# Patient Record
Sex: MALE | Race: WHITE | Employment: OTHER | URBAN - NONMETROPOLITAN AREA
[De-identification: names, ages, dates, MRNs, and addresses within clinical notes are randomized per-mention and may not be internally consistent; named-entity substitution may affect disease eponyms.]

---
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
## 2020-09-28 ENCOUNTER — OFFICE VISIT (OUTPATIENT)
Dept: FAMILY MEDICINE CLINIC | Age: 20
End: 2020-09-28
Payer: COMMERCIAL

## 2020-09-28 VITALS
HEART RATE: 88 BPM | TEMPERATURE: 97.6 F | RESPIRATION RATE: 20 BRPM | SYSTOLIC BLOOD PRESSURE: 102 MMHG | BODY MASS INDEX: 25.01 KG/M2 | WEIGHT: 127.4 LBS | HEIGHT: 60 IN | DIASTOLIC BLOOD PRESSURE: 54 MMHG

## 2020-09-28 PROCEDURE — 99385 PREV VISIT NEW AGE 18-39: CPT | Performed by: NURSE PRACTITIONER

## 2020-09-28 RX ORDER — FLUTICASONE PROPIONATE 110 UG/1
2 AEROSOL, METERED RESPIRATORY (INHALATION) 2 TIMES DAILY
COMMUNITY
End: 2022-05-19 | Stop reason: SDUPTHER

## 2020-09-28 RX ORDER — LISINOPRIL 30 MG/1
15 TABLET ORAL DAILY PRN
COMMUNITY
End: 2021-12-23

## 2020-09-28 RX ORDER — TRIAMTERENE AND HYDROCHLOROTHIAZIDE 75; 50 MG/1; MG/1
1 TABLET ORAL DAILY
Status: ON HOLD | COMMUNITY
End: 2021-09-11 | Stop reason: SDUPTHER

## 2020-09-28 SDOH — ECONOMIC STABILITY: INCOME INSECURITY: HOW HARD IS IT FOR YOU TO PAY FOR THE VERY BASICS LIKE FOOD, HOUSING, MEDICAL CARE, AND HEATING?: NOT HARD AT ALL

## 2020-09-28 SDOH — ECONOMIC STABILITY: FOOD INSECURITY: WITHIN THE PAST 12 MONTHS, THE FOOD YOU BOUGHT JUST DIDN'T LAST AND YOU DIDN'T HAVE MONEY TO GET MORE.: NEVER TRUE

## 2020-09-28 SDOH — ECONOMIC STABILITY: FOOD INSECURITY: WITHIN THE PAST 12 MONTHS, YOU WORRIED THAT YOUR FOOD WOULD RUN OUT BEFORE YOU GOT MONEY TO BUY MORE.: NEVER TRUE

## 2020-09-28 ASSESSMENT — PATIENT HEALTH QUESTIONNAIRE - PHQ9
SUM OF ALL RESPONSES TO PHQ QUESTIONS 1-9: 0
SUM OF ALL RESPONSES TO PHQ9 QUESTIONS 1 & 2: 0
SUM OF ALL RESPONSES TO PHQ QUESTIONS 1-9: 0
2. FEELING DOWN, DEPRESSED OR HOPELESS: 0
1. LITTLE INTEREST OR PLEASURE IN DOING THINGS: 0

## 2020-09-28 ASSESSMENT — ENCOUNTER SYMPTOMS
GASTROINTESTINAL NEGATIVE: 1
EYES NEGATIVE: 1
RESPIRATORY NEGATIVE: 1
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
## 2020-12-14 RX ORDER — LACTULOSE 10 G/15ML
20 SOLUTION ORAL DAILY
Qty: 2700 ML | Refills: 3 | Status: SHIPPED | OUTPATIENT
Start: 2020-12-14 | End: 2021-03-14

## 2021-03-22 ENCOUNTER — HOSPITAL ENCOUNTER (OUTPATIENT)
Dept: GENERAL RADIOLOGY | Age: 21
Discharge: HOME | End: 2021-03-22
Payer: COMMERCIAL

## 2021-03-22 ENCOUNTER — TELEPHONE (OUTPATIENT)
Dept: FAMILY MEDICINE CLINIC | Age: 21
End: 2021-03-22

## 2021-03-22 ENCOUNTER — OFFICE VISIT (OUTPATIENT)
Dept: FAMILY MEDICINE CLINIC | Age: 21
End: 2021-03-22
Payer: COMMERCIAL

## 2021-03-22 ENCOUNTER — HOSPITAL ENCOUNTER (OUTPATIENT)
Age: 21
Discharge: HOME | End: 2021-03-22
Payer: COMMERCIAL

## 2021-03-22 VITALS
TEMPERATURE: 97.1 F | BODY MASS INDEX: 24.46 KG/M2 | HEIGHT: 60 IN | OXYGEN SATURATION: 97 % | SYSTOLIC BLOOD PRESSURE: 108 MMHG | WEIGHT: 124.6 LBS | DIASTOLIC BLOOD PRESSURE: 56 MMHG | HEART RATE: 125 BPM | RESPIRATION RATE: 22 BRPM

## 2021-03-22 DIAGNOSIS — M79.671 RIGHT FOOT PAIN: ICD-10-CM

## 2021-03-22 DIAGNOSIS — S92.901A CLOSED FRACTURE OF RIGHT FOOT, INITIAL ENCOUNTER: Primary | ICD-10-CM

## 2021-03-22 DIAGNOSIS — M79.671 RIGHT FOOT PAIN: Primary | ICD-10-CM

## 2021-03-22 PROCEDURE — 73630 X-RAY EXAM OF FOOT: CPT

## 2021-03-22 PROCEDURE — 99214 OFFICE O/P EST MOD 30 MIN: CPT | Performed by: NURSE PRACTITIONER

## 2021-03-22 ASSESSMENT — ENCOUNTER SYMPTOMS
GASTROINTESTINAL NEGATIVE: 1
EYES NEGATIVE: 1
RESPIRATORY NEGATIVE: 1
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
## 2021-03-23 NOTE — PROGRESS NOTES
Mother left message on vm stating pt sees Dr Darrell Lucas already, wanting to know if it is ok to follow with her instead of [place]    Call mom [phone]

## 2021-03-26 NOTE — TELEPHONE ENCOUNTER
Patients mother voiced they went to Dr. Paul Bennett who gave patient a walking boot. Patient is doing well with it and a 4 post cane. No concerns voiced.

## 2021-03-26 NOTE — TELEPHONE ENCOUNTER
Dr. Josiah Tsai office LM stating that he does not have any openings to see for a fracture within the next week. She did voice that the patient can see any provider in that office. Mother can call their office at [phone] option 1, twice to schedule. Referral has been faxed to [phone] for if mother wants to schedule. Left message for patiens mother  to call office back.

## 2021-04-02 ENCOUNTER — HOSPITAL ENCOUNTER (OUTPATIENT)
Age: 21
Discharge: HOME | End: 2021-04-02
Payer: COMMERCIAL

## 2021-04-02 LAB
AMORPHOUS: NORMAL
BACTERIA: NORMAL
BILIRUBIN URINE: NEGATIVE
BLOOD, URINE: NEGATIVE
CASTS UA: NORMAL /LPF
CHARACTER, URINE: CLEAR
COLOR: ABNORMAL
CRYSTALS, UA: NORMAL
EPITHELIAL CELLS, UA: NORMAL /HPF
GLUCOSE, URINE: 100 MG/DL
KETONES, URINE: NEGATIVE
LEUKOCYTE ESTERASE, URINE: NEGATIVE
MUCUS: NORMAL
NITRITE, URINE: NEGATIVE
PH UA: 5.5 (ref 5–9)
PROTEIN UA: NEGATIVE MG/DL
RBC URINE: NORMAL /HPF
SPECIFIC GRAVITY UA: 1.02 (ref 1–1.03)
UROBILINOGEN, URINE: 0.2 EU/DL (ref 0.2–1)
WBC UA: NORMAL /HPF

## 2021-04-02 PROCEDURE — 81003 URINALYSIS AUTO W/O SCOPE: CPT

## 2021-04-02 PROCEDURE — 81001 URINALYSIS AUTO W/SCOPE: CPT

## 2021-04-05 ENCOUNTER — HOSPITAL ENCOUNTER (OUTPATIENT)
Age: 21
Discharge: HOME | End: 2021-04-05
Payer: COMMERCIAL

## 2021-04-05 DIAGNOSIS — R81 GLUCOSURIA: ICD-10-CM

## 2021-04-05 DIAGNOSIS — R81 GLUCOSURIA: Primary | ICD-10-CM

## 2021-04-05 PROCEDURE — 36415 COLL VENOUS BLD VENIPUNCTURE: CPT

## 2021-04-05 PROCEDURE — 83036 HEMOGLOBIN GLYCOSYLATED A1C: CPT

## 2021-04-06 LAB
AVERAGE GLUCOSE: 99 MG/DL (ref 70–126)
HBA1C MFR BLD: 5.3 % (ref 4.4–6.4)

## 2021-04-17 ENCOUNTER — HOSPITAL ENCOUNTER (OUTPATIENT)
Age: 21
Discharge: HOME | End: 2021-04-17
Payer: COMMERCIAL

## 2021-04-17 LAB
ALBUMIN SERPL-MCNC: 4.6 G/DL (ref 3.5–5.1)
ALP BLD-CCNC: 90 U/L (ref 38–126)
ALT SERPL-CCNC: 28 U/L (ref 11–66)
ANION GAP SERPL CALCULATED.3IONS-SCNC: 14 MEQ/L (ref 8–16)
AST SERPL-CCNC: 25 U/L (ref 5–40)
BILIRUB SERPL-MCNC: 0.4 MG/DL (ref 0.3–1.2)
BUN BLDV-MCNC: 26 MG/DL (ref 7–22)
CALCIUM SERPL-MCNC: 9.8 MG/DL (ref 8.5–10.5)
CHLORIDE BLD-SCNC: 96 MEQ/L (ref 98–111)
CO2: 25 MEQ/L (ref 23–33)
CREAT SERPL-MCNC: 0.6 MG/DL (ref 0.4–1.2)
GFR SERPL CREATININE-BSD FRML MDRD: > 90 ML/MIN/1.73M2
GLUCOSE BLD-MCNC: 87 MG/DL (ref 70–108)
PHOSPHORUS: 3.2 MG/DL (ref 2.4–4.7)
POTASSIUM SERPL-SCNC: 4.2 MEQ/L (ref 3.5–5.2)
SODIUM BLD-SCNC: 135 MEQ/L (ref 135–145)
TOTAL PROTEIN: 7.6 G/DL (ref 6.1–8)
URIC ACID: 4.5 MG/DL (ref 3.7–7)

## 2021-04-17 PROCEDURE — 84550 ASSAY OF BLOOD/URIC ACID: CPT

## 2021-04-17 PROCEDURE — 84100 ASSAY OF PHOSPHORUS: CPT

## 2021-04-17 PROCEDURE — 80053 COMPREHEN METABOLIC PANEL: CPT

## 2021-04-17 PROCEDURE — 36415 COLL VENOUS BLD VENIPUNCTURE: CPT

## 2021-04-22 ENCOUNTER — HOSPITAL ENCOUNTER (OUTPATIENT)
Dept: ULTRASOUND IMAGING | Age: 21
Discharge: HOME | End: 2021-04-22
Payer: COMMERCIAL

## 2021-04-22 DIAGNOSIS — N18.1 STAGE 1 CHRONIC KIDNEY DISEASE: ICD-10-CM

## 2021-04-22 PROCEDURE — 76770 US EXAM ABDO BACK WALL COMP: CPT

## 2021-08-24 ENCOUNTER — OFFICE VISIT (OUTPATIENT)
Dept: FAMILY MEDICINE CLINIC | Age: 21
End: 2021-08-24
Payer: COMMERCIAL

## 2021-08-24 ENCOUNTER — NURSE ONLY (OUTPATIENT)
Dept: LAB | Age: 21
End: 2021-08-24

## 2021-08-24 VITALS
RESPIRATION RATE: 18 BRPM | HEART RATE: 78 BPM | DIASTOLIC BLOOD PRESSURE: 70 MMHG | SYSTOLIC BLOOD PRESSURE: 120 MMHG | BODY MASS INDEX: 26.37 KG/M2 | WEIGHT: 135 LBS | TEMPERATURE: 97.8 F

## 2021-08-24 DIAGNOSIS — Z00.00 ROUTINE PHYSICAL EXAMINATION: Primary | ICD-10-CM

## 2021-08-24 DIAGNOSIS — J38.6 SUBGLOTTIC STENOSIS: ICD-10-CM

## 2021-08-24 DIAGNOSIS — G80.9 CEREBRAL PALSY, UNSPECIFIED TYPE (HCC): ICD-10-CM

## 2021-08-24 DIAGNOSIS — J44.9 CHRONIC OBSTRUCTIVE PULMONARY DISEASE, UNSPECIFIED COPD TYPE (HCC): ICD-10-CM

## 2021-08-24 DIAGNOSIS — I10 ESSENTIAL HYPERTENSION: ICD-10-CM

## 2021-08-24 DIAGNOSIS — R63.5 WEIGHT GAIN: ICD-10-CM

## 2021-08-24 LAB
ALBUMIN SERPL-MCNC: 5 G/DL (ref 3.5–5.1)
ALP BLD-CCNC: 86 U/L (ref 38–126)
ALT SERPL-CCNC: 22 U/L (ref 11–66)
ANION GAP SERPL CALCULATED.3IONS-SCNC: 17 MEQ/L (ref 8–16)
AST SERPL-CCNC: 25 U/L (ref 5–40)
BASOPHILS # BLD: 0.4 %
BASOPHILS ABSOLUTE: 0 THOU/MM3 (ref 0–0.1)
BILIRUB SERPL-MCNC: 0.2 MG/DL (ref 0.3–1.2)
BUN BLDV-MCNC: 58 MG/DL (ref 7–22)
CALCIUM SERPL-MCNC: 10.1 MG/DL (ref 8.5–10.5)
CHLORIDE BLD-SCNC: 100 MEQ/L (ref 98–111)
CO2: 20 MEQ/L (ref 23–33)
CREAT SERPL-MCNC: 2 MG/DL (ref 0.4–1.2)
EOSINOPHIL # BLD: 0.6 %
EOSINOPHILS ABSOLUTE: 0.1 THOU/MM3 (ref 0–0.4)
ERYTHROCYTE [DISTWIDTH] IN BLOOD BY AUTOMATED COUNT: 12.4 % (ref 11.5–14.5)
ERYTHROCYTE [DISTWIDTH] IN BLOOD BY AUTOMATED COUNT: 43.8 FL (ref 35–45)
GFR SERPL CREATININE-BSD FRML MDRD: 42 ML/MIN/1.73M2
GLUCOSE BLD-MCNC: 86 MG/DL (ref 70–108)
HCT VFR BLD CALC: 38.7 % (ref 42–52)
HEMOGLOBIN: 12.5 GM/DL (ref 14–18)
IMMATURE GRANS (ABS): 0.05 THOU/MM3 (ref 0–0.07)
IMMATURE GRANULOCYTES: 0.6 %
LYMPHOCYTES # BLD: 13.6 %
LYMPHOCYTES ABSOLUTE: 1.2 THOU/MM3 (ref 1–4.8)
MCH RBC QN AUTO: 31.2 PG (ref 26–33)
MCHC RBC AUTO-ENTMCNC: 32.3 GM/DL (ref 32.2–35.5)
MCV RBC AUTO: 96.5 FL (ref 80–94)
MONOCYTES # BLD: 8.7 %
MONOCYTES ABSOLUTE: 0.7 THOU/MM3 (ref 0.4–1.3)
NUCLEATED RED BLOOD CELLS: 0 /100 WBC
PLATELET # BLD: 330 THOU/MM3 (ref 130–400)
PMV BLD AUTO: 10.5 FL (ref 9.4–12.4)
POTASSIUM SERPL-SCNC: 4.9 MEQ/L (ref 3.5–5.2)
RBC # BLD: 4.01 MILL/MM3 (ref 4.7–6.1)
SARS-COV-2 ANTIBODY, TOTAL: NEGATIVE
SEG NEUTROPHILS: 76.1 %
SEGMENTED NEUTROPHILS ABSOLUTE COUNT: 6.5 THOU/MM3 (ref 1.8–7.7)
SODIUM BLD-SCNC: 137 MEQ/L (ref 135–145)
TOTAL PROTEIN: 7.9 G/DL (ref 6.1–8)
TSH SERPL DL<=0.05 MIU/L-ACNC: 2.5 UIU/ML (ref 0.4–4.2)
WBC # BLD: 8.5 THOU/MM3 (ref 4.8–10.8)

## 2021-08-24 PROCEDURE — 99395 PREV VISIT EST AGE 18-39: CPT | Performed by: NURSE PRACTITIONER

## 2021-08-24 ASSESSMENT — PATIENT HEALTH QUESTIONNAIRE - PHQ9: DEPRESSION UNABLE TO ASSESS: FUNCTIONAL CAPACITY MOTIVATION LIMITS ACCURACY

## 2021-08-24 ASSESSMENT — ENCOUNTER SYMPTOMS
GASTROINTESTINAL NEGATIVE: 1
RESPIRATORY NEGATIVE: 1
EYES NEGATIVE: 1
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
## 2021-09-09 ENCOUNTER — NURSE ONLY (OUTPATIENT)
Dept: LAB | Age: 21
End: 2021-09-09

## 2021-09-09 ENCOUNTER — HOSPITAL ENCOUNTER (INPATIENT)
Age: 21
LOS: 2 days | Discharge: HOME | DRG: 640 | End: 2021-09-11
Attending: EMERGENCY MEDICINE | Admitting: INTERNAL MEDICINE
Payer: COMMERCIAL

## 2021-09-09 ENCOUNTER — APPOINTMENT (OUTPATIENT)
Dept: ULTRASOUND IMAGING | Age: 21
DRG: 640 | End: 2021-09-09
Payer: COMMERCIAL

## 2021-09-09 ENCOUNTER — APPOINTMENT (OUTPATIENT)
Dept: GENERAL RADIOLOGY | Age: 21
DRG: 640 | End: 2021-09-09
Payer: COMMERCIAL

## 2021-09-09 DIAGNOSIS — R41.0 CONFUSION: ICD-10-CM

## 2021-09-09 DIAGNOSIS — N17.9 ACUTE KIDNEY INJURY (HCC): ICD-10-CM

## 2021-09-09 DIAGNOSIS — N19 UREMIA: ICD-10-CM

## 2021-09-09 DIAGNOSIS — R41.0 CONFUSION: Primary | ICD-10-CM

## 2021-09-09 DIAGNOSIS — I95.89 HYPOTENSION DUE TO HYPOVOLEMIA: Primary | ICD-10-CM

## 2021-09-09 DIAGNOSIS — E86.1 HYPOTENSION DUE TO HYPOVOLEMIA: Primary | ICD-10-CM

## 2021-09-09 PROBLEM — I95.9 HYPOTENSION: Status: ACTIVE | Noted: 2021-09-09

## 2021-09-09 LAB
ALBUMIN SERPL-MCNC: 4.5 GM/DL (ref 3.4–5)
ALP BLD-CCNC: 85 U/L (ref 46–116)
ALT SERPL-CCNC: 64 U/L (ref 14–63)
AMORPHOUS: ABNORMAL
ANION GAP SERPL CALCULATED.3IONS-SCNC: 12 MEQ/L (ref 8–16)
ANION GAP: 16 MEQ/L (ref 8–16)
AST SERPL-CCNC: 44 U/L (ref 15–37)
BACTERIA: ABNORMAL
BACTERIA: ABNORMAL /HPF
BASOPHILS # BLD: 0.3 % (ref 0–3)
BETA-HYDROXYBUTYRATE: 2.25 MG/DL (ref 0.2–2.81)
BILIRUB SERPL-MCNC: 0.3 MG/DL (ref 0.2–1)
BILIRUBIN URINE: NEGATIVE
BILIRUBIN URINE: NEGATIVE
BLOOD, URINE: ABNORMAL
BLOOD, URINE: ABNORMAL
BUN BLDV-MCNC: 108 MG/DL (ref 7–18)
BUN BLDV-MCNC: 76 MG/DL (ref 7–22)
CALCIUM SERPL-MCNC: 9.5 MG/DL (ref 8.5–10.5)
CASTS 2: ABNORMAL /LPF
CASTS 2: ABNORMAL /LPF
CASTS UA: ABNORMAL /LPF
CASTS UA: ABNORMAL /LPF
CHARACTER, URINE: ABNORMAL
CHARACTER, URINE: CLEAR
CHLORIDE BLD-SCNC: 105 MEQ/L (ref 98–111)
CHLORIDE BLD-SCNC: 98 MEQ/L (ref 98–107)
CO2: 23 MEQ/L (ref 23–33)
CO2: 24 MEQ/L (ref 21–32)
COLOR: YELLOW
COLOR: YELLOW
CREAT SERPL-MCNC: 1.9 MG/DL (ref 0.4–1.2)
CREAT SERPL-MCNC: 3.6 MG/DL (ref 0.6–1.3)
CRYSTALS, UA: ABNORMAL
CRYSTALS, UA: ABNORMAL
EOSINOPHILS RELATIVE PERCENT: 1.2 % (ref 0–4)
EPITHELIAL CELLS, UA: ABNORMAL /HPF
EPITHELIAL CELLS, UA: ABNORMAL /HPF
GFR SERPL CREATININE-BSD FRML MDRD: 45 ML/MIN/1.73M2
GFR, ESTIMATED: 23 ML/MIN/1.73M2
GLUCOSE BLD-MCNC: 102 MG/DL (ref 70–108)
GLUCOSE BLD-MCNC: 97 MG/DL (ref 74–106)
GLUCOSE URINE: 500 MG/DL
GLUCOSE, URINE: 250 MG/DL
HCT VFR BLD CALC: 34.4 % (ref 42–52)
HEMOGLOBIN: 11.4 GM/DL (ref 14–18)
KETONES, URINE: NEGATIVE
KETONES, URINE: NEGATIVE
LACTATE: 1.1 MMOL/L (ref 0.9–1.7)
LEUKOCYTE ESTERASE, URINE: NEGATIVE
LEUKOCYTE ESTERASE, URINE: NEGATIVE
LYMPHOCYTES # BLD: 19.9 % (ref 15–47)
MAGNESIUM: 3.1 MG/DL (ref 1.8–2.4)
MCH RBC QN AUTO: 30.4 PG (ref 27–31)
MCHC RBC AUTO-ENTMCNC: 33.1 GM/DL (ref 33–37)
MCV RBC AUTO: 91.8 FL (ref 80–94)
MISCELLANEOUS 2: ABNORMAL
MISCELLANEOUS LAB TEST RESULT: ABNORMAL
MONOCYTES: 8.1 % (ref 0–12)
MUCUS: ABNORMAL
NITRITE, URINE: NEGATIVE
NITRITE, URINE: NEGATIVE
OSMOLALITY URINE: 370 MOSMOL/KG (ref 250–750)
OSMOLALITY: 319 MOSMOL/KG (ref 275–295)
PDW BLD-RTO: 12.3 % (ref 11.5–14.5)
PH UA: 5.5 (ref 5–9)
PH UA: 5.5 (ref 5–9)
PLATELET # BLD: 384 THOU/MM3 (ref 130–400)
PMV BLD AUTO: 7.3 FL (ref 7.4–10.4)
POC CALCIUM: 9.2 MG/DL (ref 8.5–10.1)
POTASSIUM SERPL-SCNC: 3.8 MEQ/L (ref 3.5–5.1)
POTASSIUM SERPL-SCNC: 3.8 MEQ/L (ref 3.5–5.2)
PROTEIN UA: NEGATIVE
PROTEIN UA: NEGATIVE MG/DL
RBC # BLD: 3.75 MILL/MM3 (ref 4.7–6.1)
RBC UA: ABNORMAL /HPF
RBC URINE: ABNORMAL /HPF
REFLEX TO URINE C & S: ABNORMAL
RENAL EPITHELIAL, UA: ABNORMAL
SARS-COV-2, NAAT: NOT  DETECTED
SEGS: 70.5 % (ref 43–75)
SODIUM BLD-SCNC: 138 MEQ/L (ref 136–145)
SODIUM BLD-SCNC: 140 MEQ/L (ref 135–145)
SODIUM URINE: 104 MEQ/L
SPECIFIC GRAVITY UA: 1.01 (ref 1–1.03)
SPECIFIC GRAVITY, URINE: 1.01 (ref 1–1.03)
TOTAL PROTEIN: 8.3 GM/DL (ref 6.4–8.2)
UROBILINOGEN, URINE: 0.2 EU/DL (ref 0–1)
UROBILINOGEN, URINE: 0.2 EU/DL (ref 0–1)
WBC # BLD: 8.6 THOU/MM3 (ref 4.8–10.8)
WBC UA: ABNORMAL /HPF
WBC UA: ABNORMAL /HPF
YEAST: ABNORMAL

## 2021-09-09 PROCEDURE — 71045 X-RAY EXAM CHEST 1 VIEW: CPT

## 2021-09-09 PROCEDURE — 96360 HYDRATION IV INFUSION INIT: CPT

## 2021-09-09 PROCEDURE — 82010 KETONE BODYS QUAN: CPT

## 2021-09-09 PROCEDURE — 85025 COMPLETE CBC W/AUTO DIFF WBC: CPT

## 2021-09-09 PROCEDURE — 6370000000 HC RX 637 (ALT 250 FOR IP): Performed by: INTERNAL MEDICINE

## 2021-09-09 PROCEDURE — 80053 COMPREHEN METABOLIC PANEL: CPT

## 2021-09-09 PROCEDURE — 84300 ASSAY OF URINE SODIUM: CPT

## 2021-09-09 PROCEDURE — 99282 EMERGENCY DEPT VISIT SF MDM: CPT

## 2021-09-09 PROCEDURE — 36415 COLL VENOUS BLD VENIPUNCTURE: CPT

## 2021-09-09 PROCEDURE — 87635 SARS-COV-2 COVID-19 AMP PRB: CPT

## 2021-09-09 PROCEDURE — 76770 US EXAM ABDO BACK WALL COMP: CPT

## 2021-09-09 PROCEDURE — 2060000000 HC ICU INTERMEDIATE R&B

## 2021-09-09 PROCEDURE — 99222 1ST HOSP IP/OBS MODERATE 55: CPT | Performed by: INTERNAL MEDICINE

## 2021-09-09 PROCEDURE — 83735 ASSAY OF MAGNESIUM: CPT

## 2021-09-09 PROCEDURE — 99254 IP/OBS CNSLTJ NEW/EST MOD 60: CPT | Performed by: INTERNAL MEDICINE

## 2021-09-09 PROCEDURE — 81001 URINALYSIS AUTO W/SCOPE: CPT

## 2021-09-09 PROCEDURE — 2580000003 HC RX 258: Performed by: EMERGENCY MEDICINE

## 2021-09-09 PROCEDURE — 83605 ASSAY OF LACTIC ACID: CPT

## 2021-09-09 PROCEDURE — 6370000000 HC RX 637 (ALT 250 FOR IP): Performed by: STUDENT IN AN ORGANIZED HEALTH CARE EDUCATION/TRAINING PROGRAM

## 2021-09-09 PROCEDURE — 83930 ASSAY OF BLOOD OSMOLALITY: CPT

## 2021-09-09 PROCEDURE — 83935 ASSAY OF URINE OSMOLALITY: CPT

## 2021-09-09 RX ORDER — BACLOFEN 10 MG/1
20 TABLET ORAL 2 TIMES DAILY
Status: DISCONTINUED | OUTPATIENT
Start: 2021-09-09 | End: 2021-09-11 | Stop reason: HOSPADM

## 2021-09-09 RX ORDER — MULTIVITAMIN WITH IRON
1 TABLET ORAL DAILY
Status: DISCONTINUED | OUTPATIENT
Start: 2021-09-09 | End: 2021-09-11 | Stop reason: HOSPADM

## 2021-09-09 RX ORDER — ACETAMINOPHEN 650 MG/1
650 SUPPOSITORY RECTAL EVERY 6 HOURS PRN
Status: DISCONTINUED | OUTPATIENT
Start: 2021-09-09 | End: 2021-09-11 | Stop reason: HOSPADM

## 2021-09-09 RX ORDER — PROMETHAZINE HYDROCHLORIDE 25 MG/1
12.5 TABLET ORAL EVERY 6 HOURS PRN
Status: DISCONTINUED | OUTPATIENT
Start: 2021-09-09 | End: 2021-09-11 | Stop reason: HOSPADM

## 2021-09-09 RX ORDER — SODIUM CHLORIDE 0.9 % (FLUSH) 0.9 %
5-40 SYRINGE (ML) INJECTION EVERY 12 HOURS SCHEDULED
Status: DISCONTINUED | OUTPATIENT
Start: 2021-09-09 | End: 2021-09-11 | Stop reason: HOSPADM

## 2021-09-09 RX ORDER — SODIUM CHLORIDE 0.9 % (FLUSH) 0.9 %
10 SYRINGE (ML) INJECTION PRN
Status: DISCONTINUED | OUTPATIENT
Start: 2021-09-09 | End: 2021-09-11 | Stop reason: HOSPADM

## 2021-09-09 RX ORDER — 0.9 % SODIUM CHLORIDE 0.9 %
30 INTRAVENOUS SOLUTION INTRAVENOUS ONCE
Status: COMPLETED | OUTPATIENT
Start: 2021-09-09 | End: 2021-09-09

## 2021-09-09 RX ORDER — ONDANSETRON 2 MG/ML
4 INJECTION INTRAMUSCULAR; INTRAVENOUS EVERY 6 HOURS PRN
Status: DISCONTINUED | OUTPATIENT
Start: 2021-09-09 | End: 2021-09-11 | Stop reason: HOSPADM

## 2021-09-09 RX ORDER — FAMOTIDINE 20 MG/1
20 TABLET, FILM COATED ORAL DAILY
Status: DISCONTINUED | OUTPATIENT
Start: 2021-09-10 | End: 2021-09-11 | Stop reason: HOSPADM

## 2021-09-09 RX ORDER — LEVALBUTEROL TARTRATE 45 UG/1
2 AEROSOL, METERED ORAL EVERY 6 HOURS PRN
Status: DISCONTINUED | OUTPATIENT
Start: 2021-09-09 | End: 2021-09-11 | Stop reason: HOSPADM

## 2021-09-09 RX ORDER — SENNA AND DOCUSATE SODIUM 50; 8.6 MG/1; MG/1
1 TABLET, FILM COATED ORAL NIGHTLY
Status: DISCONTINUED | OUTPATIENT
Start: 2021-09-09 | End: 2021-09-11 | Stop reason: HOSPADM

## 2021-09-09 RX ORDER — SENNA PLUS 8.6 MG/1
1 TABLET ORAL DAILY PRN
Status: DISCONTINUED | OUTPATIENT
Start: 2021-09-09 | End: 2021-09-11 | Stop reason: HOSPADM

## 2021-09-09 RX ORDER — FLUTICASONE PROPIONATE 50 MCG
1 SPRAY, SUSPENSION (ML) NASAL 2 TIMES DAILY
Status: DISCONTINUED | OUTPATIENT
Start: 2021-09-09 | End: 2021-09-11 | Stop reason: HOSPADM

## 2021-09-09 RX ORDER — HEPARIN SODIUM 5000 [USP'U]/ML
5000 INJECTION, SOLUTION INTRAVENOUS; SUBCUTANEOUS EVERY 8 HOURS SCHEDULED
Status: DISCONTINUED | OUTPATIENT
Start: 2021-09-09 | End: 2021-09-11 | Stop reason: HOSPADM

## 2021-09-09 RX ORDER — SODIUM CHLORIDE 9 MG/ML
INJECTION, SOLUTION INTRAVENOUS CONTINUOUS
Status: ACTIVE | OUTPATIENT
Start: 2021-09-09 | End: 2021-09-10

## 2021-09-09 RX ORDER — LACTULOSE 10 G/15ML
30 SOLUTION ORAL DAILY
COMMUNITY
End: 2021-12-23

## 2021-09-09 RX ORDER — FLUTICASONE PROPIONATE 110 UG/1
2 AEROSOL, METERED RESPIRATORY (INHALATION) 2 TIMES DAILY
Status: DISCONTINUED | OUTPATIENT
Start: 2021-09-09 | End: 2021-09-11 | Stop reason: HOSPADM

## 2021-09-09 RX ORDER — SODIUM CHLORIDE 9 MG/ML
25 INJECTION, SOLUTION INTRAVENOUS PRN
Status: DISCONTINUED | OUTPATIENT
Start: 2021-09-09 | End: 2021-09-11 | Stop reason: HOSPADM

## 2021-09-09 RX ORDER — ACETAMINOPHEN 325 MG/1
650 TABLET ORAL EVERY 6 HOURS PRN
Status: DISCONTINUED | OUTPATIENT
Start: 2021-09-09 | End: 2021-09-11 | Stop reason: HOSPADM

## 2021-09-09 RX ADMIN — SENNOSIDES 8.6 MG: 8.6 TABLET, COATED ORAL at 21:28

## 2021-09-09 RX ADMIN — SODIUM CHLORIDE 1905 ML: 9 INJECTION, SOLUTION INTRAVENOUS at 11:30

## 2021-09-09 RX ADMIN — FLUTICASONE PROPIONATE 1 SPRAY: 50 SPRAY, METERED NASAL at 21:28

## 2021-09-09 RX ADMIN — Medication 1 TABLET: at 21:28

## 2021-09-09 RX ADMIN — SODIUM CHLORIDE: 9 INJECTION, SOLUTION INTRAVENOUS at 13:53

## 2021-09-09 ASSESSMENT — ENCOUNTER SYMPTOMS
VOMITING: 0
COUGH: 0
DIARRHEA: 0
CONSTIPATION: 1
SHORTNESS OF BREATH: 0

## 2021-09-09 NOTE — ED NOTES
1st bag of fluids infused, 2nd bag of saline hung at wide open rate, site soft and without edema or redness.      Corby Santos RN  09/09/21 [id number]

## 2021-09-09 NOTE — ED NOTES
Pt more alert, resp even and unlabored, skin pale, warm and dry. IV continues at 500ml/hour, site soft and without edema or redness. [facility] here and report given, pt released to be transported to [address] [place] [address] in stable condition.       Brittany Cardozo RN  09/09/21 6777

## 2021-09-09 NOTE — ED NOTES
[facility] called for transport to [facility]. 2nd bag of fluids infused, 3rd bag of 1000ml's . 9NS hung at 500ml/hour via Dr Sandra Shell.      Javi Ying RN  09/09/21 4800
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
## 2021-09-09 NOTE — ED NOTES
Dad complains of pt acting confused and not acting right for the last week. Dad denies fever, vomiting or diarrhea. Pt alert but staring straight ahead, resp even and unlabored, skin pale, warm and dry.       Ovidio Hanson RN  09/09/21 [id number]
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
## 2021-09-09 NOTE — PROGRESS NOTES
Transfer  Report from Brennen Angulo  Referring Physician  Dr. Elissa Redman  Accepting Physician Dr. Wojciech Phoenix  Patient Condition:     2000. Patient of Dr. Elissa Redman at [facility]. Presented with c/o per mom of more confused, not eating and drinking well at home over the last week. Patient has CP lives at home and is nonverbal. Base CR less than .8, today 3.6 and . HB 11.4, Mg 3.1, Lactic 1.10, Covid sent. Given fluid bolus. Lowest B/P systolic in the 95'I. Last . T 97, HR 68, R 20.  Admit to Stepdown, Inpatient, Hypotension

## 2021-09-09 NOTE — ED NOTES
Dad states pt is acting more alert at this time, pt given lunch and juice to drink.      Javi Ynig RN  09/09/21 1884

## 2021-09-10 LAB
ALBUMIN SERPL-MCNC: 4.6 G/DL (ref 3.5–5.1)
ALP BLD-CCNC: 77 U/L (ref 38–126)
ALT SERPL-CCNC: 46 U/L (ref 11–66)
AMMONIA: 46 UMOL/L (ref 11–60)
ANION GAP SERPL CALCULATED.3IONS-SCNC: 11 MEQ/L (ref 8–16)
ANION GAP SERPL CALCULATED.3IONS-SCNC: 18 MEQ/L (ref 8–16)
AST SERPL-CCNC: 45 U/L (ref 5–40)
BASOPHILS # BLD: 0.5 %
BASOPHILS ABSOLUTE: 0 THOU/MM3 (ref 0–0.1)
BILIRUB SERPL-MCNC: < 0.2 MG/DL (ref 0.3–1.2)
BUN BLDV-MCNC: 102 MG/DL (ref 7–22)
BUN BLDV-MCNC: 55 MG/DL (ref 7–22)
CALCIUM SERPL-MCNC: 9.7 MG/DL (ref 8.5–10.5)
CALCIUM SERPL-MCNC: 9.9 MG/DL (ref 8.5–10.5)
CHLORIDE BLD-SCNC: 106 MEQ/L (ref 98–111)
CHLORIDE BLD-SCNC: 96 MEQ/L (ref 98–111)
CO2: 20 MEQ/L (ref 23–33)
CO2: 25 MEQ/L (ref 23–33)
CREAT SERPL-MCNC: 1.2 MG/DL (ref 0.4–1.2)
CREAT SERPL-MCNC: 3.3 MG/DL (ref 0.4–1.2)
EOSINOPHIL # BLD: 0.6 %
EOSINOPHILS ABSOLUTE: 0.1 THOU/MM3 (ref 0–0.4)
ERYTHROCYTE [DISTWIDTH] IN BLOOD BY AUTOMATED COUNT: 12.3 % (ref 11.5–14.5)
ERYTHROCYTE [DISTWIDTH] IN BLOOD BY AUTOMATED COUNT: 12.3 % (ref 11.5–14.5)
ERYTHROCYTE [DISTWIDTH] IN BLOOD BY AUTOMATED COUNT: 42.6 FL (ref 35–45)
ERYTHROCYTE [DISTWIDTH] IN BLOOD BY AUTOMATED COUNT: 44.1 FL (ref 35–45)
FERRITIN: 539 NG/ML (ref 22–322)
GFR SERPL CREATININE-BSD FRML MDRD: 24 ML/MIN/1.73M2
GFR SERPL CREATININE-BSD FRML MDRD: 76 ML/MIN/1.73M2
GLUCOSE BLD-MCNC: 100 MG/DL (ref 70–108)
GLUCOSE BLD-MCNC: 90 MG/DL (ref 70–108)
HCT VFR BLD CALC: 32.5 % (ref 42–52)
HCT VFR BLD CALC: 35.5 % (ref 42–52)
HEMOGLOBIN: 10.5 GM/DL (ref 14–18)
HEMOGLOBIN: 11.4 GM/DL (ref 14–18)
IMMATURE GRANS (ABS): 0.07 THOU/MM3 (ref 0–0.07)
IMMATURE GRANULOCYTES: 0.8 %
IRON SATURATION: 34 % (ref 20–50)
IRON: 94 UG/DL (ref 65–195)
LYMPHOCYTES # BLD: 17.1 %
LYMPHOCYTES ABSOLUTE: 1.6 THOU/MM3 (ref 1–4.8)
MCH RBC QN AUTO: 30.8 PG (ref 26–33)
MCH RBC QN AUTO: 31.2 PG (ref 26–33)
MCHC RBC AUTO-ENTMCNC: 32.1 GM/DL (ref 32.2–35.5)
MCHC RBC AUTO-ENTMCNC: 32.3 GM/DL (ref 32.2–35.5)
MCV RBC AUTO: 95.3 FL (ref 80–94)
MCV RBC AUTO: 97.3 FL (ref 80–94)
MONOCYTES # BLD: 9.1 %
MONOCYTES ABSOLUTE: 0.8 THOU/MM3 (ref 0.4–1.3)
NUCLEATED RED BLOOD CELLS: 0 /100 WBC
PLATELET # BLD: 313 THOU/MM3 (ref 130–400)
PLATELET # BLD: 344 THOU/MM3 (ref 130–400)
PMV BLD AUTO: 10.5 FL (ref 9.4–12.4)
PMV BLD AUTO: 9.3 FL (ref 9.4–12.4)
POTASSIUM REFLEX MAGNESIUM: 4.1 MEQ/L (ref 3.5–5.2)
POTASSIUM SERPL-SCNC: 4.3 MEQ/L (ref 3.5–5.2)
RBC # BLD: 3.41 MILL/MM3 (ref 4.7–6.1)
RBC # BLD: 3.65 MILL/MM3 (ref 4.7–6.1)
SEG NEUTROPHILS: 71.9 %
SEGMENTED NEUTROPHILS ABSOLUTE COUNT: 6.7 THOU/MM3 (ref 1.8–7.7)
SODIUM BLD-SCNC: 134 MEQ/L (ref 135–145)
SODIUM BLD-SCNC: 142 MEQ/L (ref 135–145)
TOTAL IRON BINDING CAPACITY: 275 UG/DL (ref 171–450)
TOTAL PROTEIN: 7.6 G/DL (ref 6.1–8)
TSH SERPL DL<=0.05 MIU/L-ACNC: 2.96 UIU/ML (ref 0.4–4.2)
WBC # BLD: 6.3 THOU/MM3 (ref 4.8–10.8)
WBC # BLD: 9.3 THOU/MM3 (ref 4.8–10.8)

## 2021-09-10 PROCEDURE — 6370000000 HC RX 637 (ALT 250 FOR IP): Performed by: INTERNAL MEDICINE

## 2021-09-10 PROCEDURE — 83540 ASSAY OF IRON: CPT

## 2021-09-10 PROCEDURE — 83550 IRON BINDING TEST: CPT

## 2021-09-10 PROCEDURE — 82728 ASSAY OF FERRITIN: CPT

## 2021-09-10 PROCEDURE — 99232 SBSQ HOSP IP/OBS MODERATE 35: CPT | Performed by: INTERNAL MEDICINE

## 2021-09-10 PROCEDURE — 85027 COMPLETE CBC AUTOMATED: CPT

## 2021-09-10 PROCEDURE — 6370000000 HC RX 637 (ALT 250 FOR IP): Performed by: STUDENT IN AN ORGANIZED HEALTH CARE EDUCATION/TRAINING PROGRAM

## 2021-09-10 PROCEDURE — 2060000000 HC ICU INTERMEDIATE R&B

## 2021-09-10 PROCEDURE — 94640 AIRWAY INHALATION TREATMENT: CPT

## 2021-09-10 PROCEDURE — 36415 COLL VENOUS BLD VENIPUNCTURE: CPT

## 2021-09-10 PROCEDURE — 94760 N-INVAS EAR/PLS OXIMETRY 1: CPT

## 2021-09-10 PROCEDURE — 2580000003 HC RX 258: Performed by: INTERNAL MEDICINE

## 2021-09-10 PROCEDURE — 97166 OT EVAL MOD COMPLEX 45 MIN: CPT

## 2021-09-10 PROCEDURE — 97530 THERAPEUTIC ACTIVITIES: CPT

## 2021-09-10 PROCEDURE — 80048 BASIC METABOLIC PNL TOTAL CA: CPT

## 2021-09-10 PROCEDURE — 2580000003 HC RX 258: Performed by: STUDENT IN AN ORGANIZED HEALTH CARE EDUCATION/TRAINING PROGRAM

## 2021-09-10 RX ORDER — PODOFILOX 5 MG/ML
SOLUTION TOPICAL 2 TIMES DAILY
COMMUNITY
End: 2021-09-15

## 2021-09-10 RX ORDER — ONABOTULINUMTOXINA 100 [USP'U]/1
100 INJECTION, POWDER, LYOPHILIZED, FOR SOLUTION INTRADERMAL; INTRAMUSCULAR
COMMUNITY

## 2021-09-10 RX ADMIN — FLUTICASONE PROPIONATE 2 PUFF: 110 AEROSOL, METERED RESPIRATORY (INHALATION) at 07:41

## 2021-09-10 RX ADMIN — Medication 1 TABLET: at 09:14

## 2021-09-10 RX ADMIN — FLUTICASONE PROPIONATE 2 PUFF: 110 AEROSOL, METERED RESPIRATORY (INHALATION) at 17:52

## 2021-09-10 RX ADMIN — SODIUM CHLORIDE: 9 INJECTION, SOLUTION INTRAVENOUS at 03:49

## 2021-09-10 RX ADMIN — FLUTICASONE PROPIONATE 1 SPRAY: 50 SPRAY, METERED NASAL at 20:47

## 2021-09-10 RX ADMIN — FAMOTIDINE 20 MG: 20 TABLET, FILM COATED ORAL at 09:15

## 2021-09-10 RX ADMIN — DOCUSATE SODIUM 50 MG AND SENNOSIDES 8.6 MG 1 TABLET: 8.6; 5 TABLET, FILM COATED ORAL at 20:49

## 2021-09-10 RX ADMIN — SODIUM CHLORIDE, PRESERVATIVE FREE 10 ML: 5 INJECTION INTRAVENOUS at 20:54
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
## 2021-09-10 NOTE — PROGRESS NOTES
Pharmacy Medication History Note    List of current medications patient is taking is complete. Source of information: Patient family, dispense report, epic medication history. Changes made to medication list:  Medications removed (include reason, ex. therapy complete or physician discontinued): Added botox 100 units injections for leg spasms. Added podofilox 0.5% solution for foot wart. Medications added/doses adjusted:  None    Other notes (ex. Recent course of antibiotics, Coumadin dosing):  Denies use of other OTC or herbal medications.     Allergies reviewed    Electronically signed by Justa Virgen on 9/10/2021 at 10:54 AM
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
## 2021-09-10 NOTE — PROGRESS NOTES
24 23 25   * 76* 55*   CREATININE 3.6* 1.9* 1.2   CALCIUM  --  9.5 9.9   LABALBU 4.5  --   --    BILITOT 0.3  --   --              ASSESSMENT / Plan   1. Renal -acute kidney injury most likely appears to be prerenal in nature the use of diuretics and ARB  ? Currently getting IV fluids and creatinine already started to improve. Currently down to 1.2  ? Still with high BUN to creatinine ratio continue IV fluids for today     2. Electrolytes -within normal limits  3. Essential hypertension running reasonable, was hypotensive upon admission antihypertensive medications are on hold. However upon discharge can resume lisinopril. And hold the diuretic  4. Hx of COPD  5. Hx of severe  birth at 32 weeks  6. Hx of nephrolithiasis  7. Meds reviewed and discussed with patient's mom and nursing staff  8. If discharged please have him follow-up in the office in 2 to 3 weeks with a BMP prior and no diuretic upon discharge can resume lisinopril. Dr. Andres Morgan MD, M,D.  [facility].
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
## 2021-09-10 NOTE — PROGRESS NOTES
Summer [address] 60  PHYSICAL THERAPY MISSED TREATMENT NOTE  STRZ ICU STEPDOWN TELEMETRY 4K    Date: 9/10/2021  Patient Name: Lorraine Whitley        MRN: [id number]   : 2000  (24 y.o.)  Gender: male                REASON FOR MISSED TREATMENT:  Per communication with OT, pt is functioning at baseline and parents are very supportive, and assist with mobility. Pt does not have a need for skilled physical therapy intervention at this time. Please re-order if a need arises.       Stephane Webster, PT, DPT

## 2021-09-10 NOTE — FLOWSHEET NOTE
Pt was in bed as he was eating. His father was visiting with him. He wanted prayer to cope and heal. He was anointed. 09/10/21 1386   Encounter Summary   Services provided to: Patient and family together   Referral/Consult From: [address] [facility] Parent   [facility]   Continue Visiting Yes  (9/10)   Complexity of Encounter Low   Length of Encounter 15 minutes   Routine   Type Initial   Assessment Approachable;Calm   Intervention [place];Prayer;Nurtured hope   Outcome Acceptance;Expressed gratitude;Encouraged; Hopeful   Sacraments   Sacrament of Sick-Anointing Anointed
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
## 2021-09-11 VITALS
DIASTOLIC BLOOD PRESSURE: 67 MMHG | SYSTOLIC BLOOD PRESSURE: 123 MMHG | OXYGEN SATURATION: 97 % | WEIGHT: 140 LBS | TEMPERATURE: 97.9 F | BODY MASS INDEX: 27.34 KG/M2 | HEART RATE: 110 BPM | RESPIRATION RATE: 17 BRPM

## 2021-09-11 PROBLEM — N17.9 AKI (ACUTE KIDNEY INJURY) (HCC): Status: ACTIVE | Noted: 2021-09-11

## 2021-09-11 PROBLEM — N19 UREMIA, ACUTE: Status: ACTIVE | Noted: 2021-09-11

## 2021-09-11 LAB
ANION GAP SERPL CALCULATED.3IONS-SCNC: 12 MEQ/L (ref 8–16)
BUN BLDV-MCNC: 39 MG/DL (ref 7–22)
CALCIUM SERPL-MCNC: 10.4 MG/DL (ref 8.5–10.5)
CHLORIDE BLD-SCNC: 104 MEQ/L (ref 98–111)
CO2: 25 MEQ/L (ref 23–33)
CREAT SERPL-MCNC: 1.1 MG/DL (ref 0.4–1.2)
ERYTHROCYTE [DISTWIDTH] IN BLOOD BY AUTOMATED COUNT: 12.1 % (ref 11.5–14.5)
ERYTHROCYTE [DISTWIDTH] IN BLOOD BY AUTOMATED COUNT: 42.5 FL (ref 35–45)
GFR SERPL CREATININE-BSD FRML MDRD: 84 ML/MIN/1.73M2
GLUCOSE BLD-MCNC: 101 MG/DL (ref 70–108)
HCT VFR BLD CALC: 34.8 % (ref 42–52)
HEMOGLOBIN: 11.2 GM/DL (ref 14–18)
MCH RBC QN AUTO: 30.9 PG (ref 26–33)
MCHC RBC AUTO-ENTMCNC: 32.2 GM/DL (ref 32.2–35.5)
MCV RBC AUTO: 96.1 FL (ref 80–94)
PLATELET # BLD: 337 THOU/MM3 (ref 130–400)
PMV BLD AUTO: 9.4 FL (ref 9.4–12.4)
POTASSIUM SERPL-SCNC: 3.8 MEQ/L (ref 3.5–5.2)
RBC # BLD: 3.62 MILL/MM3 (ref 4.7–6.1)
SODIUM BLD-SCNC: 141 MEQ/L (ref 135–145)
WBC # BLD: 7.1 THOU/MM3 (ref 4.8–10.8)

## 2021-09-11 PROCEDURE — 99239 HOSP IP/OBS DSCHRG MGMT >30: CPT | Performed by: INTERNAL MEDICINE

## 2021-09-11 PROCEDURE — 6370000000 HC RX 637 (ALT 250 FOR IP): Performed by: INTERNAL MEDICINE

## 2021-09-11 PROCEDURE — 94640 AIRWAY INHALATION TREATMENT: CPT

## 2021-09-11 PROCEDURE — 85027 COMPLETE CBC AUTOMATED: CPT

## 2021-09-11 PROCEDURE — 80048 BASIC METABOLIC PNL TOTAL CA: CPT

## 2021-09-11 PROCEDURE — 99231 SBSQ HOSP IP/OBS SF/LOW 25: CPT | Performed by: INTERNAL MEDICINE

## 2021-09-11 PROCEDURE — 36415 COLL VENOUS BLD VENIPUNCTURE: CPT

## 2021-09-11 PROCEDURE — 2580000003 HC RX 258: Performed by: STUDENT IN AN ORGANIZED HEALTH CARE EDUCATION/TRAINING PROGRAM

## 2021-09-11 RX ORDER — LISINOPRIL 20 MG/1
20 TABLET ORAL ONCE
Status: DISCONTINUED | OUTPATIENT
Start: 2021-09-11 | End: 2021-09-11

## 2021-09-11 RX ORDER — TRIAMTERENE AND HYDROCHLOROTHIAZIDE 75; 50 MG/1; MG/1
1 TABLET ORAL DAILY
Qty: 30 TABLET | Refills: 3 | Status: SHIPPED
Start: 2021-09-11 | End: 2022-04-14

## 2021-09-11 RX ADMIN — FLUTICASONE PROPIONATE 1 SPRAY: 50 SPRAY, METERED NASAL at 09:37

## 2021-09-11 RX ADMIN — SODIUM CHLORIDE, PRESERVATIVE FREE 10 ML: 5 INJECTION INTRAVENOUS at 09:37

## 2021-09-11 RX ADMIN — FLUTICASONE PROPIONATE 2 PUFF: 110 AEROSOL, METERED RESPIRATORY (INHALATION) at 07:39

## 2021-09-11 RX ADMIN — FAMOTIDINE 20 MG: 20 TABLET, FILM COATED ORAL at 09:37

## 2021-09-11 RX ADMIN — Medication 1 TABLET: at 09:36

## 2021-09-11 ASSESSMENT — PAIN SCALES - GENERAL: PAINLEVEL_OUTOF10: 0

## 2021-09-11 NOTE — PROGRESS NOTES
[facility]   Nephrology progress note  9/11/2021, 12:26 PM      Pt Name:    Cortez Crowell  MRN:     [id number]     [place]:    2000  Admit Date:    9/9/2021 11:19 AM  Primary Care Physician:  LIVE Brennan CNP   Room number  9N-68/326-U    Chief Complaint: Nephrology following for FOZIA    Subjective:  Patient seen and examined earlier today  This is late entry  No chest pain or shortness of breath  Feels okay  Parents in the room    Objective:  24HR INTAKE/OUTPUT:    Intake/Output Summary (Last 24 hours) at 9/11/2021 1226  Last data filed at 9/11/2021 0326  Gross per 24 hour   Intake 1410 ml   Output 1600 ml   Net -190 ml     I/O last 3 completed shifts: In: 5022 [P.O.:1400; I.V.:10]  Out: 1600 [Urine:1600]  No intake/output data recorded. Admission weight: 140 lb (63.5 kg)  Wt Readings from Last 3 Encounters:   09/09/21 140 lb (63.5 kg)   08/24/21 135 lb (61.2 kg)   03/22/21 124 lb 9.6 oz (56.5 kg)     Body mass index is 27.34 kg/m².     Physical examination  VITALS:     Vitals:    09/11/21 0326 09/11/21 0740 09/11/21 0915 09/11/21 0930   BP: (!) 121/55  95/67 123/67   Pulse: 90  110    Resp: 18 18 17    Temp: 97.5 °F (36.4 °C)  97.9 °F (36.6 °C)    TempSrc: Oral  Oral    SpO2: 99% 98% 97%    Weight:         General Appearance:  appears comfortable, no distress  Neck: No JVD noted  Lungs:  no use of accessory muscles  Extremities: No significant LE edema      Lab Data  CBC:   Recent Labs     09/09/21  1146 09/10/21  0502 09/11/21  0610   WBC 8.6 6.3 7.1   HGB 11.4* 10.5* 11.2*   HCT 34.4* 32.5* 34.8*    313 337     BMP:  Recent Labs     09/09/21  1046 09/09/21  1146 09/09/21  1723 09/10/21  0502 09/11/21  0609   NA   < > 138 140 142 141   K   < > 3.8 3.8 4.1 3.8   CL   < > 98 105 106 104   CO2   < > 24 23 25 25   BUN   < > 108* 76* 55* 39*   CREATININE   < > 3.6* 1.9* 1.2 1.1   GLUCOSE   < > 97 102 100 101   CALCIUM   < >  --  9.5 9.9 10.4   MG  --  3.1*  --   --   -- < > = values in this interval not displayed. Hepatic:   Recent Labs     09/09/21  1046 09/09/21  1146   LABALBU 4.6 4.5   AST 45* 44*   ALT 46 64*   BILITOT <0.2* 0.3   ALKPHOS 77 85         Meds:  Infusion:    sodium chloride       Meds:    sodium chloride flush  5-40 mL IntraVENous 2 times per day    heparin (porcine)  5,000 Units SubCUTAneous 3 times per day    multivitamin  1 tablet Oral Daily    fluticasone  1 spray Each Nostril BID    fluticasone  2 puff Inhalation BID    famotidine  20 mg Oral Daily    [Held by provider] baclofen  20 mg Oral BID    sennosides-docusate sodium  1 tablet Oral Nightly     Meds prn: sodium chloride flush, sodium chloride, acetaminophen **OR** acetaminophen, senna, promethazine **OR** ondansetron, levalbuterol       Impression and Plan:  1. FOZIA. Improved  Overall serum creatinine is stable  Follow nephrology discharge instructions as per Dr. Sam Stokes note from yesterday  Electrolytes are overall stable  2. Essential hypertension. Was hypotensive. Now improved  Advised parents to call office with some blood pressure readings next week on Monday  3.   History of nephrolithiasis    D/W family  See Dr. Cullen Rock in office in 2 weeks with Alexander Urbina MD  [facility]

## 2021-09-11 NOTE — PROGRESS NOTES
Discharge teaching and instructions for diagnosis/procedure of hypotension completed with patient using teachback method. AVS reviewed. Printed prescriptions given to patient. Patient voiced understanding regarding prescriptions, follow up appointments, and care of self at home. Discharged in a wheelchair to  home with support per family.

## 2021-09-13 ENCOUNTER — CARE COORDINATION (OUTPATIENT)
Dept: OTHER | Facility: CLINIC | Age: 21
End: 2021-09-13

## 2021-09-13 ENCOUNTER — TELEPHONE (OUTPATIENT)
Dept: PULMONOLOGY | Age: 21
End: 2021-09-13

## 2021-09-13 NOTE — TELEPHONE ENCOUNTER
Patient's mother is calling about Fan Bazan becoming a new pt. She said that he was a 26 wk preemie and said he currently sees a pulmonologist at [address] [place] [address] but will be aging out and needs a new dr. She was advised to have them send a referral to the office but is wanting to know which dr. Would be a better fit and has more experience dealing with cases like his. She mentioned that he had been seen at the hospital recently and we should be able to view his records from that.

## 2021-09-13 NOTE — CARE COORDINATION
[facility] Transitions Initial Follow Up Call    Call within 2 business days of discharge: Yes    Patient: Rina Macias Patient : 2000   MRN: [id number]  Reason for Admission: Uremia  Discharge Date: 21 RARS: Readmission Risk Score: 12      Last Discharge [address] [address]       Complaint Diagnosis Description Type Department Provider    21 Altered Mental Status Hypotension due to hypovolemia . .. ED to Hosp-Admission (Discharged) (ADMITTED) [facility] 4K Desiree Delcid DO; Prerna Cottrell. .. Spoke with: No one    Facility: [facility] attempted to reach patient's mother for introduction to Associate Care Management related to post discharge for Uremia. HIPAA compliant message left requesting a return phone call. Will attempt to outreach patient's mother again.        Follow Up  Future Appointments   Date Time Provider Eliud Senior   9/15/2021  9:15 AM LIVE Olmos - CNP [place]  [facility] [place]   9/15/2021  1:20 PM Charma Kawasaki, [facility] Shella Pallas, RN

## 2021-09-14 ENCOUNTER — NURSE ONLY (OUTPATIENT)
Dept: LAB | Age: 21
End: 2021-09-14

## 2021-09-14 ENCOUNTER — CARE COORDINATION (OUTPATIENT)
Dept: OTHER | Facility: CLINIC | Age: 21
End: 2021-09-14

## 2021-09-14 ENCOUNTER — APPOINTMENT (OUTPATIENT)
Dept: CT IMAGING | Age: 21
End: 2021-09-14
Payer: COMMERCIAL

## 2021-09-14 ENCOUNTER — HOSPITAL ENCOUNTER (EMERGENCY)
Age: 21
Discharge: HOME | End: 2021-09-14
Attending: FAMILY MEDICINE
Payer: COMMERCIAL

## 2021-09-14 ENCOUNTER — APPOINTMENT (OUTPATIENT)
Dept: GENERAL RADIOLOGY | Age: 21
End: 2021-09-14
Payer: COMMERCIAL

## 2021-09-14 VITALS
BODY MASS INDEX: 27.48 KG/M2 | HEART RATE: 78 BPM | OXYGEN SATURATION: 94 % | WEIGHT: 140 LBS | DIASTOLIC BLOOD PRESSURE: 55 MMHG | SYSTOLIC BLOOD PRESSURE: 99 MMHG | RESPIRATION RATE: 8 BRPM | HEIGHT: 60 IN | TEMPERATURE: 97.8 F

## 2021-09-14 DIAGNOSIS — N19 UREMIA: ICD-10-CM

## 2021-09-14 DIAGNOSIS — R40.4 TRANSIENT ALTERATION OF AWARENESS: Primary | ICD-10-CM

## 2021-09-14 DIAGNOSIS — N17.9 ACUTE KIDNEY INJURY (HCC): ICD-10-CM

## 2021-09-14 LAB
ALBUMIN SERPL-MCNC: 4.1 GM/DL (ref 3.4–5)
ALP BLD-CCNC: 68 U/L (ref 46–116)
ALT SERPL-CCNC: 46 U/L (ref 14–63)
ANION GAP: 11 MEQ/L (ref 8–16)
AST SERPL-CCNC: 23 U/L (ref 15–37)
BASOPHILS # BLD: 0.5 % (ref 0–3)
BILIRUB SERPL-MCNC: 0.2 MG/DL (ref 0.2–1)
BILIRUBIN DIRECT: < 0.1 MG/DL (ref 0–0.2)
BUN BLDV-MCNC: 24 MG/DL (ref 7–18)
CHLORIDE BLD-SCNC: 104 MEQ/L (ref 98–107)
CO2: 28 MEQ/L (ref 21–32)
CREAT SERPL-MCNC: 1 MG/DL (ref 0.6–1.3)
EKG ATRIAL RATE: 90 BPM
EKG P AXIS: 35 DEGREES
EKG P-R INTERVAL: 128 MS
EKG Q-T INTERVAL: 340 MS
EKG QRS DURATION: 84 MS
EKG QTC CALCULATION (BAZETT): 415 MS
EKG R AXIS: 49 DEGREES
EKG T AXIS: 31 DEGREES
EKG VENTRICULAR RATE: 90 BPM
EOSINOPHILS RELATIVE PERCENT: 1.5 % (ref 0–4)
GFR, ESTIMATED: > 90 ML/MIN/1.73M2
GLUCOSE BLD-MCNC: 75 MG/DL (ref 74–106)
HCT VFR BLD CALC: 31.3 % (ref 42–52)
HEMOGLOBIN: 10.3 GM/DL (ref 14–18)
LYMPHOCYTES # BLD: 18.5 % (ref 15–47)
MCH RBC QN AUTO: 30.4 PG (ref 27–31)
MCHC RBC AUTO-ENTMCNC: 32.9 GM/DL (ref 33–37)
MCV RBC AUTO: 92.6 FL (ref 80–94)
MONOCYTES: 7.3 % (ref 0–12)
PDW BLD-RTO: 12.8 % (ref 11.5–14.5)
PLATELET # BLD: 343 THOU/MM3 (ref 130–400)
PMV BLD AUTO: 7.2 FL (ref 7.4–10.4)
POC CALCIUM: 9.6 MG/DL (ref 8.5–10.1)
POTASSIUM SERPL-SCNC: 3.6 MEQ/L (ref 3.5–5.1)
RBC # BLD: 3.38 MILL/MM3 (ref 4.7–6.1)
SEGS: 72.2 % (ref 43–75)
SODIUM BLD-SCNC: 143 MEQ/L (ref 136–145)
TOTAL PROTEIN: 7.7 GM/DL (ref 6.4–8.2)
WBC # BLD: 6.6 THOU/MM3 (ref 4.8–10.8)

## 2021-09-14 PROCEDURE — 80076 HEPATIC FUNCTION PANEL: CPT

## 2021-09-14 PROCEDURE — 70450 CT HEAD/BRAIN W/O DYE: CPT

## 2021-09-14 PROCEDURE — 71045 X-RAY EXAM CHEST 1 VIEW: CPT

## 2021-09-14 PROCEDURE — 85025 COMPLETE CBC W/AUTO DIFF WBC: CPT

## 2021-09-14 PROCEDURE — 2580000003 HC RX 258: Performed by: FAMILY MEDICINE

## 2021-09-14 PROCEDURE — 99284 EMERGENCY DEPT VISIT MOD MDM: CPT

## 2021-09-14 PROCEDURE — 36415 COLL VENOUS BLD VENIPUNCTURE: CPT

## 2021-09-14 PROCEDURE — 93005 ELECTROCARDIOGRAM TRACING: CPT | Performed by: FAMILY MEDICINE

## 2021-09-14 RX ORDER — 0.9 % SODIUM CHLORIDE 0.9 %
1000 INTRAVENOUS SOLUTION INTRAVENOUS ONCE
Status: COMPLETED | OUTPATIENT
Start: 2021-09-14 | End: 2021-09-14

## 2021-09-14 RX ADMIN — SODIUM CHLORIDE 1000 ML: 9 INJECTION, SOLUTION INTRAVENOUS at 09:55

## 2021-09-14 NOTE — ED NOTES
Dr. Clifford Salazar into discuss poc and test results with parents.       Chadd Bojorquez RN  09/14/21 9334

## 2021-09-14 NOTE — ED NOTES
Message left with Dr. Lore Rutherford nurse line to schedule appointment for pt. Parents informed will call them with time and date.       Enrique Craven RN  09/14/21 7303
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
## 2021-09-14 NOTE — ED NOTES
Pt. Remains alert and oriented, conversing with parents. Pulse regular. Extremities warm. Respirations regular and quiet. Mucous membranes pink & moist. Alert and oriented times 3. No nausea or vomiting. Range of motion within patient's limits. Skin pink, warm and dry. Calm and cooperative. AVS rev'd with parents and copy given.      Judge Magdy RN  09/14/21 0895

## 2021-09-14 NOTE — ED NOTES
Pt. Presents to ED via [facility]. EMS after he was at therapy and became pale, therapist took pulse ox and was 85%. Pt. Did not seem to know where he was at mother voices. Pt. On arrival is awake and alert, skin pink warm and dry. Pt.placed on continous cardiac monitor showing NSR, no ectopy.       Enrique Craven RN  09/14/21 1902

## 2021-09-15 ENCOUNTER — OFFICE VISIT (OUTPATIENT)
Dept: NEPHROLOGY | Age: 21
End: 2021-09-15
Payer: COMMERCIAL

## 2021-09-15 ENCOUNTER — CARE COORDINATION (OUTPATIENT)
Dept: OTHER | Facility: CLINIC | Age: 21
End: 2021-09-15

## 2021-09-15 ENCOUNTER — OFFICE VISIT (OUTPATIENT)
Dept: FAMILY MEDICINE CLINIC | Age: 21
End: 2021-09-15
Payer: COMMERCIAL

## 2021-09-15 VITALS
BODY MASS INDEX: 26.74 KG/M2 | DIASTOLIC BLOOD PRESSURE: 52 MMHG | WEIGHT: 136.9 LBS | SYSTOLIC BLOOD PRESSURE: 104 MMHG | HEART RATE: 68 BPM | TEMPERATURE: 97.4 F | RESPIRATION RATE: 22 BRPM | OXYGEN SATURATION: 98 %

## 2021-09-15 VITALS
WEIGHT: 137 LBS | TEMPERATURE: 97.6 F | SYSTOLIC BLOOD PRESSURE: 102 MMHG | DIASTOLIC BLOOD PRESSURE: 56 MMHG | OXYGEN SATURATION: 98 % | HEART RATE: 102 BPM | BODY MASS INDEX: 26.76 KG/M2

## 2021-09-15 DIAGNOSIS — N20.0 NEPHROLITHIASIS: Primary | ICD-10-CM

## 2021-09-15 DIAGNOSIS — I10 ESSENTIAL HYPERTENSION: ICD-10-CM

## 2021-09-15 DIAGNOSIS — R41.0 CONFUSION: ICD-10-CM

## 2021-09-15 DIAGNOSIS — N17.9 AKI (ACUTE KIDNEY INJURY) (HCC): Primary | ICD-10-CM

## 2021-09-15 DIAGNOSIS — R41.82 ALTERED MENTAL STATUS, UNSPECIFIED ALTERED MENTAL STATUS TYPE: ICD-10-CM

## 2021-09-15 DIAGNOSIS — G80.9 CEREBRAL PALSY, UNSPECIFIED TYPE (HCC): ICD-10-CM

## 2021-09-15 DIAGNOSIS — K59.00 CONSTIPATION, UNSPECIFIED CONSTIPATION TYPE: ICD-10-CM

## 2021-09-15 PROCEDURE — 99213 OFFICE O/P EST LOW 20 MIN: CPT | Performed by: INTERNAL MEDICINE

## 2021-09-15 PROCEDURE — 1111F DSCHRG MED/CURRENT MED MERGE: CPT | Performed by: NURSE PRACTITIONER

## 2021-09-15 PROCEDURE — 99215 OFFICE O/P EST HI 40 MIN: CPT | Performed by: NURSE PRACTITIONER

## 2021-09-15 PROCEDURE — 1111F DSCHRG MED/CURRENT MED MERGE: CPT | Performed by: INTERNAL MEDICINE

## 2021-09-15 RX ORDER — LEVALBUTEROL 1.25 MG/.5ML
1 SOLUTION, CONCENTRATE RESPIRATORY (INHALATION) EVERY 4 HOURS PRN
COMMUNITY
End: 2022-04-14

## 2021-09-15 ASSESSMENT — ENCOUNTER SYMPTOMS
GASTROINTESTINAL NEGATIVE: 1
EYES NEGATIVE: 1
RESPIRATORY NEGATIVE: 1
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
## 2021-09-15 NOTE — PROGRESS NOTES
Post-Discharge Transitional Care Management Services or Hospital Follow Up      Tawana Lee   YOB: 2000    Date of Office Visit:  9/15/2021  Date of Hospital Admission: 9/14/21  Date of Hospital Discharge: 9/14/21  Readmission Risk Score(high >=14%.  Medium >=10%):Readmission Risk Score: 12      Care management risk score Rising risk (score 2-5) and Complex Care (Scores >=6): 5     Non face to face  following discharge, date last encounter closed (first attempt may have been earlier): 9/14/2021  5:04 PM 9/14/2021  5:04 PM    Call initiated 2 business days of discharge: Yes     Patient Active Problem List   Diagnosis    Hypotension    Uremia, acute    FOZIA (acute kidney injury) ([facility]       Allergies   Allergen Reactions    Cephalosporins Dermatitis     Lethargic, red ears       Medications listed as ordered at the time of discharge from hospital   Rashid, [address] Medication Instructions REBECCA:    Printed on:09/15/21 4456   Medication Information                      acetaminophen (TYLENOL) 80 MG chewable tablet  Take 80 mg by mouth every 4 hours as needed              baclofen (LIORESAL) 20 MG tablet  Take 20 mg by mouth 2 times daily              famotidine (PEPCID) 20 MG tablet  Take 20 mg by mouth daily             fluticasone (FLOVENT HFA) 110 MCG/ACT inhaler  Inhale 2 puffs into the lungs 2 times daily             FLUTICASONE PROPIONATE, NASAL, NA  1 spray by Nasal route daily as needed 1 spray each nostril daily              lactulose (CHRONULAC) 10 GM/15ML solution  Take 30 g by mouth daily              levalbuterol (XOPENEX) 1.25 MG/0.5ML nebulizer solution  Take 1 ampule by nebulization every 4 hours as needed for Wheezing             Levalbuterol Tartrate (XOPENEX HFA IN)  Inhale 2 puffs into the lungs every 4-6 hours as needed              lisinopril (PRINIVIL;ZESTRIL) 30 MG tablet  Take 15 mg by mouth daily as needed Mother giving pt 15 mg             Multiple Vitamin (MULTI-VITAMIN DAILY PO)  Take 1 tablet by mouth daily             NONFORMULARY  Take 1 tablet by mouth 2 times daily Cerevive             onabotulinumtoxin A (BOTOX) 100 units injection  Inject 100 Units into the muscle Every 4 months (last dose July 2021)  Used for leg spasms             triamterene-hydroCHLOROthiazide (MAXZIDE) 75-50 MG per tablet  Take 1 tablet by mouth daily Hold until SBP > 140, start with 1/2 tablet and only increase to full tablet if SBP remains > 140 in subsequent days. Medications marked \"taking\" at this time  Outpatient Medications Marked as Taking for the 9/15/21 encounter (Office Visit) with , APRN - CNP   Medication Sig Dispense Refill    levalbuterol (XOPENEX) 1.25 MG/0.5ML nebulizer solution Take 1 ampule by nebulization every 4 hours as needed for Wheezing (Patient not taking: Reported on 9/15/2021)      lactulose (CHRONULAC) 10 GM/15ML solution Take 30 g by mouth daily  (Patient not taking: Reported on 9/15/2021)      famotidine (PEPCID) 20 MG tablet Take 20 mg by mouth daily      lisinopril (PRINIVIL;ZESTRIL) 30 MG tablet Take 15 mg by mouth daily as needed Mother giving pt 15 mg      NONFORMULARY Take 1 tablet by mouth 2 times daily Cerevive (Patient not taking: Reported on 9/15/2021)      FLUTICASONE PROPIONATE, NASAL, NA 1 spray by Nasal route daily as needed 1 spray each nostril daily       fluticasone (FLOVENT HFA) 110 MCG/ACT inhaler Inhale 2 puffs into the lungs 2 times daily      baclofen (LIORESAL) 20 MG tablet Take 20 mg by mouth 2 times daily           Medications patient taking as of now reconciled against medications ordered at time of hospital discharge: Yes    Chief Complaint   Patient presents with   [address] from [address] [place] [address] Exam       HPI  Pt had progressive change in disposition and weakness. More confusion. Presented to ER and found to have profound FOZIA. Resolved with hydration.   Since being discharged did have another episode of confusion. Was in ER  Ct head neg  Inpatient course: Discharge summary reviewed- see chart. Interval history/Current status: stable today    Review of Systems   Constitutional: Negative. HENT: Negative. Eyes: Negative. Respiratory: Negative. Cardiovascular: Negative. Gastrointestinal: Negative. Musculoskeletal: Negative. Skin: Negative. Neurological: Negative. Psychiatric/Behavioral: Positive for confusion. Vitals:    09/15/21 0923   BP: (!) 104/52   Site: Left Upper Arm   Position: Sitting   Cuff Size: Medium Adult   Pulse: 68   Resp: 22   Temp: 97.4 °F (36.3 °C)   TempSrc: Temporal   SpO2: 98%   Weight: 136 lb 14.4 oz (62.1 kg)     Body mass index is 26.74 kg/m². Wt Readings from Last 3 Encounters:   09/15/21 137 lb (62.1 kg)   09/15/21 136 lb 14.4 oz (62.1 kg)   09/14/21 140 lb (63.5 kg)     BP Readings from Last 3 Encounters:   09/15/21 (!) 102/56   09/15/21 (!) 104/52   09/14/21 (!) 99/55       Physical Exam  Constitutional:       Appearance: He is well-developed. HENT:      Head: Normocephalic. Right Ear: Tympanic membrane and external ear normal.      Left Ear: Tympanic membrane and external ear normal.      Nose: Nose normal.   Cardiovascular:      Rate and Rhythm: Normal rate and regular rhythm. Heart sounds: Normal heart sounds. No murmur heard. No friction rub. No gallop. Pulmonary:      Effort: Pulmonary effort is normal.      Breath sounds: Transmitted upper airway sounds present. No wheezing or rales. Abdominal:      General: Bowel sounds are normal.      Palpations: Abdomen is soft. Tenderness: There is no abdominal tenderness. There is no guarding. Musculoskeletal:         General: Normal range of motion. Cervical back: Normal range of motion and neck supple. Lymphadenopathy:      Cervical: No cervical adenopathy. Skin:     General: Skin is warm.    Neurological:      Mental Status: He is alert and oriented to person, place, and time. Deep Tendon Reflexes: Reflexes are normal and symmetric. Assessment/Plan:  1. Essential hypertension  With pt having htn and prematurity refer to cardiology. Likely needs echo  - Erica Ceballos MD, Cardiology, [facility] CURRENT OUTPATIENT MED LIST    2. Constipation, unspecified constipation type  Chronic issue. Lactulose may have contributed to kidney injury. - Emerson Stone MD, Gastroenterology, [address] [place] [address] CURRENT OUTPATIENT MED LIST    3. FOZIA (acute kidney injury) ([address]  Fu with nephrology. stable    4. Altered mental status, unspecified altered mental status type  Will obtain EEG. Unsure of cause of recent episode  - EEG awake and drowsy; Future    5. Cerebral palsy, unspecified type [facility])    - Valentina Mccullough MD, Neurology, [facility]    6.  Confusion    - Valentina Mccullough MD, Neurology, [facility] Decision Making: high complexity

## 2021-09-15 NOTE — CARE COORDINATION
[facility] Transitions Follow Up Call    9/15/2021    Patient: Wendy Waldron  Patient : 2000   MRN: [id number]  Reason for Admission: Uremia, acute  Discharge Date: 21 RARS: Readmission Risk Score: 15         Spoke with: Zohra Javier, patient's mother    Transitions of Care Initial Call      Challenges to be reviewed by the provider   Additional needs identified to be addressed with provider: No  none             Method of communication with provider : none    Was this a readmission? No  Patient stated reason for admission: confused, lethargic, not eating or drinking  Patients top risk factors for readmission: medical condition-CP and polypharmacy    Care Transition Nurse (CTN) contacted the parent by telephone to perform post hospital discharge assessment. Verified name and  with parent as identifiers. Provided introduction to self, and explanation of the CTN role. CTN reviewed discharge instructions, medical action plan and red flags with parent who verbalized understanding. Parent given an opportunity to ask questions and does not have any further questions or concerns at this time. Were discharge instructions available to patient? Yes. Reviewed appropriate site of care based on symptoms and resources available to patient including: PCP, Specialist, When to call 911 and [address]. The parent agrees to contact the PCP office for questions related to their healthcare. Medication reconciliation was performed with parent, who verbalizes understanding of administration of home medications. Advised obtaining a 90-day supply of all daily and as-needed medications. Covid Risk Education     Educated patient about risk for severe COVID-19 due to risk factors according to CDC guidelines. ACM reviewed discharge instructions, medical action plan and red flag symptoms with the parent who verbalized understanding. Discussed COVID vaccination status: Yes and Patient is vaccinated.       ACM provided contact information. Plan for follow-up call in 5-7 days based on severity of symptoms and risk factors. Plan for next call: symptom management-lethargy, mentation, follow up appointment-Completed follow up with Cardio and GI and medication management-med changes or questions      Care Transitions Subsequent and Final Call    Subsequent and Final Calls  Do you have any ongoing symptoms?: Yes  Onset of Patient-reported symptoms: In the past 7 days  Patient-reported symptoms: Other  Have your medications changed?: Yes  Patient Reports: Mom states patient does not use Xopenex nebulized med and is not taking Maxide, Multi-vitamin or Cerevive. May take Lactulose prn constipation  Do you have any questions related to your medications?: No  Do you currently have any active services?: No  Do you have any needs or concerns that I can assist you with?: No  Care Transitions Interventions  No Identified Needs  Other Interventions:         Spoke with patient's mother for initial care transition call post hospital discharge. Med review completed. Patient is only taking Tylenol prn, Baclofen, Pepcid, Flovent, Flonase prn, Xopenex inhaler prn, Lisinopril 15 mg for sbp greater than 140. Patient's sbp today 102-118. Patient's mother is checking bid. Patient had follow up with PCP and Nephrology today. Repeat bmp to be obtained in one month. Per mother, covid antibody test to be obtained with future labs. Patient is vaccinated. Per mom, patient is lethargic, responds to verbal stimulus. Per mom, patient is sluggish, not as mentally sharp as normal.  At this time, patient's mother does not feel it's is safe for patient to be left home alone. Patient goes to [facility] a [facility] Disability [facility].  Patient's mom reports supportive friends. Discussed caregiver strain. Per Hayden Car, patient is eating ok, she did purchase some foods with increased sodium. Patient is drinking water.   Patient has Gatorade and apple juice available also. Cody Marques doesn't want to give lactulose. Patient had bm today. Per Cody Marques, referral to neurology is being made by PCP. Patient has follow up with Cardiology Dr. Venkatesh Sarkar on 9/16/21 and GI on 9/21/21. Patient's mother to assess stool for blood. Patient's mother denies any further needs, questions, or concerns at this time. Patient's mother is agreeable to future follow up calls. Prior to Admission medications    Medication Sig Start Date End Date Taking? Authorizing Provider   famotidine (PEPCID) 20 MG tablet Take 20 mg by mouth daily   Yes Historical Provider, MD   lisinopril (PRINIVIL;ZESTRIL) 30 MG tablet Take 15 mg by mouth daily as needed Mother giving pt 15 mg   Yes Historical Provider, MD   FLUTICASONE PROPIONATE, NASAL, NA 1 spray by Nasal route daily as needed 1 spray each nostril daily    Yes Historical Provider, MD   fluticasone (FLOVENT HFA) 110 MCG/ACT inhaler Inhale 2 puffs into the lungs 2 times daily   Yes Historical Provider, MD   Levalbuterol Tartrate (XOPENEX HFA IN) Inhale 2 puffs into the lungs every 4-6 hours as needed    Yes Historical Provider, MD   baclofen (LIORESAL) 20 MG tablet Take 20 mg by mouth 2 times daily    Yes Historical Provider, MD   acetaminophen (TYLENOL) 80 MG chewable tablet Take 80 mg by mouth every 4 hours as needed    Yes Historical Provider, MD   levalbuterol (XOPENEX) 1.25 MG/0.5ML nebulizer solution Take 1 ampule by nebulization every 4 hours as needed for Wheezing  Patient not taking: Reported on 9/15/2021    Historical Provider, MD   triamterene-hydroCHLOROthiazide (MAXZIDE) 75-50 MG per tablet Take 1 tablet by mouth daily Hold until SBP > 140, start with 1/2 tablet and only increase to full tablet if SBP remains > 140 in subsequent days.   Patient not taking: Reported on 9/15/2021 9/11/21   Shruthi [facility], DO   onabotulinumtoxin A (BOTOX) 100 units injection Inject 100 Units into the muscle Every 4 months (last dose July 2021)  Used for leg spasms  Patient not taking: Reported on 9/15/2021    Historical Provider, MD   lactulose (CHRONULAC) 10 GM/15ML solution Take 30 g by mouth daily   Patient not taking: Reported on 9/15/2021    Historical Provider, MD   Multiple Vitamin (MULTI-VITAMIN DAILY PO) Take 1 tablet by mouth daily  Patient not taking: Reported on 9/15/2021    Historical Provider, MD   NONFORMULARY Take 1 tablet by mouth 2 times daily Cerevive  Patient not taking: Reported on 9/15/2021    Historical Provider, MD       Follow Up  Future Appointments   Date Time Provider Eliud Senior   9/16/2021  9:00 AM Jermaine Gutierrez MD N [facility] Heart [address]   10/19/2021  9:00 AM [facility] NEURODIAG ROOM 1 STRZ EEG [address] [place] [address]   11/4/2021  2:20 PM MD Lexi Marcelo[address]   3/22/2022  9:20 AM [facility]

## 2021-09-15 NOTE — PROGRESS NOTES
Has only taken 0.5 of Lisinopril x 1 dose since the hospital.     ER yesterday with Chest Xray and CT head. Only taking pepcid and baclofen.

## 2021-09-16 ENCOUNTER — OFFICE VISIT (OUTPATIENT)
Dept: CARDIOLOGY CLINIC | Age: 21
End: 2021-09-16
Payer: COMMERCIAL

## 2021-09-16 ENCOUNTER — HOSPITAL ENCOUNTER (OUTPATIENT)
Dept: NON INVASIVE DIAGNOSTICS | Age: 21
Discharge: HOME | End: 2021-09-16
Payer: COMMERCIAL

## 2021-09-16 VITALS
HEART RATE: 78 BPM | DIASTOLIC BLOOD PRESSURE: 62 MMHG | HEIGHT: 60 IN | WEIGHT: 135.8 LBS | SYSTOLIC BLOOD PRESSURE: 130 MMHG | BODY MASS INDEX: 26.66 KG/M2

## 2021-09-16 DIAGNOSIS — R42 LIGHTHEADEDNESS: Primary | ICD-10-CM

## 2021-09-16 DIAGNOSIS — I10 ESSENTIAL HYPERTENSION: ICD-10-CM

## 2021-09-16 DIAGNOSIS — R42 LIGHTHEADEDNESS: ICD-10-CM

## 2021-09-16 DIAGNOSIS — R42 DIZZINESS: ICD-10-CM

## 2021-09-16 LAB
LV EF: 58 %
LVEF MODALITY: NORMAL

## 2021-09-16 PROCEDURE — 99204 OFFICE O/P NEW MOD 45 MIN: CPT | Performed by: INTERNAL MEDICINE

## 2021-09-16 PROCEDURE — 93306 TTE W/DOPPLER COMPLETE: CPT
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
## 2021-09-22 ENCOUNTER — CARE COORDINATION (OUTPATIENT)
Dept: OTHER | Facility: CLINIC | Age: 21
End: 2021-09-22

## 2021-09-22 NOTE — CARE COORDINATION
[facility] Transitions Follow Up Call    2021    Patient: Lorraine Whitley  Patient : 2000   MRN: [id number]  Reason for Admission: Uremia  Discharge Date: 21 RARS: Readmission Risk Score: 12         Spoke with: No one    ACM attempted to reach patient for follow up call regarding recent admission for Uremia. HIPAA compliant message left requesting a return phone call at patients convenience. Will continue to follow.        Follow Up  Future Appointments   Date Time Provider Eliud Senior   10/19/2021  9:00 AM STR NEURODIAG ROOM 1 [facility] [place] [facility]   10/21/2021  9:00 AM [facility] [place] - CNP N [facility] [place]   10/21/2021  2:00 PM Sonam Fay MD N [facility] Heart [facility] [place]   2021  2:20 PM Darren Hutson MD N Pulm Med [facility]   3/22/2022  9:20 AM Bishop Curiel MD N [facility] [place] - Mariajose Hurtado RN

## 2021-09-23 ENCOUNTER — CARE COORDINATION (OUTPATIENT)
Dept: OTHER | Facility: CLINIC | Age: 21
End: 2021-09-23

## 2021-09-23 NOTE — CARE COORDINATION
[facility] 45 Transitions Follow Up Call    2021    Patient: Matt Guajardo  Patient : 2000   MRN: [id number]  Reason for Admission: Uremia  Discharge Date: 21 RARS: Readmission Risk Score: 12         Spoke with: Sin Menchaca, patient's mother    Care Transitions Follow Up Call    Needs to be reviewed by the provider   Additional needs identified to be addressed with provider: No  none             Method of communication with provider : none      Care Transition Nurse (CTN) contacted the family by telephone to follow up after admission on 21. Verified name and  with family as identifiers. Addressed changes since last contact: none    CTN reviewed discharge instructions, medical action plan and red flags with family and discussed any barriers to care and/or understanding of plan of care after discharge. Discussed appropriate site of care based on symptoms and resources available to patient including: PCP, Specialist, When to call 911 and [address]. The family agrees to contact the PCP office for questions related to their healthcare. Patients top risk factors for readmission: functional cognitive ability and medical condition-CP, ckd, anxiety  Interventions to address risk factors: Scheduled appointment with Specialist-Per patient's mom, completed follow up with GI and Pulmonology. Patient has appt scheduled for EEG on 10/19/21 and Neurology on 10/21. CTN provided contact information for future needs. Plan for follow-up call in 7-10 days based on severity of symptoms and risk factors.   Plan for next call: symptom management-cognition, self management-self care and medication management-Med changes or questions    Care Transitions Subsequent and Final Call    Subsequent and Final Calls  Do you have any ongoing symptoms?: Yes  Onset of Patient-reported symptoms: Other  Patient-reported symptoms: Other  Have your medications changed?: No  Do you have any questions related to your medications?: No  Do you currently have any active services?: No  Do you have any needs or concerns that I can assist you with?: No  Identified Barriers: Other  Care Transitions Interventions  No Identified Needs  Other Interventions:         Spoke with patient's mother for follow up care transition call. Patient's mom reports GI is ordering an upper GI and possibly a colonoscopy due to low H&H. Unknown if blood is noted in stool. Patient's mother states patient will not let her assess stool or refrain from flushing after bm. Per patient, bm today. Patient is currently not taking Lactulose. Pulmonology notified mother pft decreased from 67 to 64. Patient's mother reports breathing is at baseline; SpO2 %. Denies cough. Per patient's mother, patient is paler than normal.  Patient appears forgetful, forgot to take meds one morning, and not cognitively himself. Patient's mom reports patient does respond appropriately when name is called; no noted seizure activity. Patient has EEG scheduled for 10/19 and Neuro appt on 10/21. Patient's mother denies any self-harm. Patient's mom is unsure if s/s are due to depression. Patient's mom states increased anxiety due to hospitalization; however, patient is not anxious about returning to hospital.  Patient will not answer if he is sad or happy when asked by mother. Patient is eating and hydrating appropriately. Patient continues to go to Day Program.  Patient's mother states she has phone number for [employer] for herself. Discussed assistance that can be provided by [facility]. Patient's mother denies any further needs, questions, or concerns at this time. Patient's mother is agreeable to future follow up calls. Prior to Visit Medications    Medication Sig Taking?  Authorizing Provider   famotidine (PEPCID) 20 MG tablet Take 20 mg by mouth daily Yes Historical Provider, MD   Multiple Vitamin (MULTI-VITAMIN DAILY PO) Take 1 tablet by mouth daily  Yes Historical Provider, MD   fluticasone (FLOVENT HFA) 110 MCG/ACT inhaler Inhale 2 puffs into the lungs 2 times daily Yes Historical Provider, MD   baclofen (LIORESAL) 20 MG tablet Take 20 mg by mouth 2 times daily  Yes Historical Provider, MD   levalbuterol (XOPENEX) 1.25 MG/0.5ML nebulizer solution Take 1 ampule by nebulization every 4 hours as needed for Wheezing   Patient not taking: Reported on 9/23/2021  Historical Provider, MD   triamterene-hydroCHLOROthiazide (MAXZIDE) 75-50 MG per tablet Take 1 tablet by mouth daily Hold until SBP > 140, start with 1/2 tablet and only increase to full tablet if SBP remains > 140 in subsequent days.   Patient not taking: Reported on 9/23/2021  Hamzah Noguera,    onabotulinumtoxin A (BOTOX) 100 units injection Inject 100 Units into the muscle Every 4 months (last dose July 2021)  Used for leg spasms  Historical Provider, MD   lactulose (CHRONULAC) 10 GM/15ML solution Take 30 g by mouth daily   Patient not taking: Reported on 9/23/2021  Historical Provider, MD   lisinopril (PRINIVIL;ZESTRIL) 30 MG tablet Take 15 mg by mouth daily as needed Mother giving pt 15 mg  Patient not taking: Reported on 9/23/2021  Historical Provider, MD   NONFORMULARY Take 1 tablet by mouth 2 times daily Cerevive   Historical Provider, MD   FLUTICASONE PROPIONATE, NASAL, NA 1 spray by Nasal route daily as needed 1 spray each nostril daily   Historical Provider, MD   Levalbuterol Tartrate (XOPENEX HFA IN) Inhale 2 puffs into the lungs every 4-6 hours as needed   Patient not taking: Reported on 9/23/2021  Historical Provider, MD   acetaminophen (TYLENOL) 80 MG chewable tablet Take 80 mg by mouth every 4 hours as needed   Historical Provider, MD         Follow Up  Future Appointments   Date Time Provider Eliud Senior   10/19/2021  9:00 AM STR [address]   10/21/2021  9:00 AM Evelyne [address]ino. [address]   10/21/2021  2:00 PM Jermaine Gutierrez MD N [facility] Heart [place] [address]   11/4/2021  2:20 PM Zabrina Torres  [address] [place] [address]   3/22/2022  9:20 AM Avila Coronado MD [facility] [place] [facility] [place] Iglesias RN

## 2021-09-30 ENCOUNTER — CARE COORDINATION (OUTPATIENT)
Dept: OTHER | Facility: CLINIC | Age: 21
End: 2021-09-30

## 2021-10-01 ENCOUNTER — CARE COORDINATION (OUTPATIENT)
Dept: OTHER | Facility: CLINIC | Age: 21
End: 2021-10-01

## 2021-10-01 NOTE — CARE COORDINATION
[facility] 45 Transitions Follow Up Call    10/1/2021    Patient: Nehemiah Trujillo  Patient : 2000   MRN: [id number]  Reason for Admission: Uremia  Discharge Date: 21 RARS: Readmission Risk Score: 12         Spoke with: Fausto Vick, patient's mother    Care Transitions Follow Up Call    Needs to be reviewed by the provider   Additional needs identified to be addressed with provider: No  none             Method of communication with provider : none      Care Transition Nurse (CTN) contacted the parent by telephone to follow up after admission on 21. Verified name and  with parent as identifiers. Addressed changes since last contact: medications-started Linzess and resumed Vitamins and new consults and testing ordered  Discussed follow-up appointments. If no appointment was previously scheduled, appointment scheduling offered: Patient completed follow up. Is follow up appointment scheduled within 7 days of discharge? Yes. CTN reviewed discharge instructions, medical action plan and red flags with parent and discussed any barriers to care and/or understanding of plan of care after discharge. Discussed appropriate site of care based on symptoms and resources available to patient including: PCP, Specialist, When to call 911 and [address]. The parent agrees to contact the PCP office for questions related to their healthcare. Patients top risk factors for readmission: functional cognitive ability and medical condition-CP    CTN provided contact information for future needs. Plan for follow-up call in 7-10 days based on severity of symptoms and risk factors.   Plan for next call: symptom management-anxiety, abdominal pain, follow up appointment-Any new appts scheduled and medication management-Med changes or questions      Care Transitions Subsequent and Final Call    Subsequent and Final Calls  Do you have any ongoing symptoms?: Yes  Patient-reported symptoms: Abdominal Pain, Other  Have your medications changed?: Yes  Patient Reports: Carmencita Schilder you have any questions related to your medications?: No  Do you currently have any active services?: No  Do you have any needs or concerns that I can assist you with?: No  Identified Barriers: Other  Care Transitions Interventions  Other Interventions:         Spoke with patient's mother for follow up care transition call. Per Deyanira Orr, patient has been complaining of some RLQ pain and dry heaving after eating. Patient is scheduled for EGD on 11/2/21 and CT of the abdomen on 10/9/21. Patient is eating. Per mom, patient does have a feeling of fullness. Patient moving bowels daily. Started on Linzess. Patient is still not taking bp meds due to sbp 102. Per Deyanira Orr, patient is needy and anxious. Disability Board is consulting Mental Health for patient. Patient's mother denies any further needs, questions, or concerns at this time. Patient's mother is agreeable to future follow up calls.          Follow Up  Future Appointments   Date Time Provider Eliud Senior   10/9/2021  9:00 AM STR [facility] CT IMAGING RM1 STRZ PUT OP STR [place] R   10/19/2021  9:00 AM STR NEURODIAG ROOM 1 STRZ EEG [place] HOD   10/21/2021  9:00 AM LIVE [place] - CNP N [facility]   10/21/2021  2:00 PM Milena Alfonso MD N [facility] Heart [facility]   11/4/2021  2:20 PM Annabel De Los Santos MD [facility] [facility]   3/22/2022  9:20 AM Choco Hicks MD [facility]. [facility] - Mihir Wills RN

## 2021-10-08 ENCOUNTER — HOSPITAL ENCOUNTER (OUTPATIENT)
Age: 21
Discharge: HOME | End: 2021-10-08
Payer: COMMERCIAL

## 2021-10-08 ENCOUNTER — TELEPHONE (OUTPATIENT)
Dept: NEPHROLOGY | Age: 21
End: 2021-10-08

## 2021-10-08 ENCOUNTER — CARE COORDINATION (OUTPATIENT)
Dept: OTHER | Facility: CLINIC | Age: 21
End: 2021-10-08

## 2021-10-08 DIAGNOSIS — N20.0 NEPHROLITHIASIS: ICD-10-CM

## 2021-10-08 DIAGNOSIS — N17.9 AKI (ACUTE KIDNEY INJURY) (HCC): ICD-10-CM

## 2021-10-08 DIAGNOSIS — I10 ESSENTIAL HYPERTENSION: ICD-10-CM

## 2021-10-08 DIAGNOSIS — R41.82 ALTERED MENTAL STATUS, UNSPECIFIED ALTERED MENTAL STATUS TYPE: ICD-10-CM

## 2021-10-08 LAB
ANION GAP SERPL CALCULATED.3IONS-SCNC: 15 MEQ/L (ref 8–16)
BUN BLDV-MCNC: 24 MG/DL (ref 7–22)
CALCIUM SERPL-MCNC: 10 MG/DL (ref 8.5–10.5)
CHLORIDE BLD-SCNC: 103 MEQ/L (ref 98–111)
CO2: 21 MEQ/L (ref 23–33)
CREAT SERPL-MCNC: 0.8 MG/DL (ref 0.4–1.2)
CREATININE, URINE: 80 MG/DL
GFR SERPL CREATININE-BSD FRML MDRD: > 90 ML/MIN/1.73M2
GLUCOSE BLD-MCNC: 99 MG/DL (ref 70–108)
MICROALBUMIN UR-MCNC: < 1.2 MG/DL
MICROALBUMIN/CREAT UR-RTO: 15 MG/G (ref 0–30)
POTASSIUM SERPL-SCNC: 3.9 MEQ/L (ref 3.5–5.2)
PROTEIN, URINE: 6.2 MG/DL
SODIUM BLD-SCNC: 139 MEQ/L (ref 135–145)

## 2021-10-08 PROCEDURE — 36415 COLL VENOUS BLD VENIPUNCTURE: CPT

## 2021-10-08 PROCEDURE — 84156 ASSAY OF PROTEIN URINE: CPT

## 2021-10-08 PROCEDURE — 86769 SARS-COV-2 COVID-19 ANTIBODY: CPT

## 2021-10-08 PROCEDURE — 80048 BASIC METABOLIC PNL TOTAL CA: CPT

## 2021-10-08 PROCEDURE — 82043 UR ALBUMIN QUANTITATIVE: CPT

## 2021-10-08 NOTE — TELEPHONE ENCOUNTER
Patient needs cleared for CT Ab and Pelvis with contrast. It's scheduled for tomorrow. GFR from 9/14/21 is >90, creatinine 1.0, BUN 24.

## 2021-10-08 NOTE — CARE COORDINATION
[facility] Transitions Follow Up Call    10/8/2021    Patient: Sheilah Pallas  Patient : 2000   MRN: [id number]  Reason for Admission: Uremia, acute  Discharge Date: 21 RARS: Readmission Risk Score: 12         Spoke with: Nilam Talbot, Patient's mom    Care Transitions Follow Up Call    Needs to be reviewed by the provider   Additional needs identified to be addressed with provider: No  none             Method of communication with provider : none      Care Transition Nurse (CTN) contacted the parent by telephone to follow up after admission on 10/1/21. Verified name and  with parent as identifiers. Addressed changes since last contact: medications-Increased Linzess to 72 mcg bid  Discussed follow-up appointments. If no appointment was previously scheduled, appointment scheduling offered: Patient completed follow up. Is follow up appointment scheduled within 7 days of discharge? Yes. CTN reviewed discharge instructions, medical action plan and red flags with parent and discussed any barriers to care and/or understanding of plan of care after discharge. Discussed appropriate site of care based on symptoms and resources available to patient including: PCP, Specialist, When to call 911 and [address]. The parent agrees to contact the PCP office for questions related to their healthcare. CTN provided contact information for future needs. Plan for follow-up call in 5-7 days based on severity of symptoms and risk factors.   Plan for next call: symptom management-abdominal pain, \"sick\", follow up appointment-Any new appts scheulded or completed and medication management-Med changes    Care Transitions Subsequent and Final Call    Subsequent and Final Calls  Do you have any ongoing symptoms?: Yes  Onset of Patient-reported symptoms: Other  Patient-reported symptoms: Other  Have your medications changed?: Yes  Patient Reports: Theresa Nohemy increased to 72 mcg bid  Do you have any questions related to your medications?: No  Do you currently have any active services?: No  Do you have any needs or concerns that I can assist you with?: No  Identified Barriers: Other  Care Transitions Interventions  No Identified Needs  Other Interventions:         Spoke with patient's mom for final care transition call. Patient's mom agreeable to care management. Savannah Garcia reports on Monday she was visiting with an uncle that she doesn't see much and patient called [facility] to get a job. Patient also repeatedly calls his mom when she is working or busy even if his father is home. Patient is waking mom in the middle of the night with complaints of being sick. Patient's mom reports patient does not embellish on complaints. Patient repeatedly wants to go for a ride in the car. Patient is unable to drive. Discussed a golf cart with restrictor plate for patient to [place] drive [place] with an adult. Patient's mom states they do permit him to drive the lawn tractor when they aren't busy. Patient anxious per mom. Patient is still awaiting Mental Health Assessment by Disability Board. Patient is neglecting some of his self care unless reminded, such as administering meds, bathing, brushing teeth and wearing retainer. Patient is eating. Mom is unsure how much patient is hydrating. Patient is now taking Linzess bid. Per mom, patient states he is moving his bowels. Patient's bp today 142/82; however, today is first day sbp greater than 140. Patient's mom continues to hold Lisinopril. Per patient's mom, patient is declining, not at baseline. Patient is scheduled for lab work and CT of abdomen tomorrow. Patient's mom voicing frustration and stress. Patient's father will not permit patient to go to respite care at all. Patient does have a younger brother, who is almost 23years old and lives at home. Patient's mom denies any further needs, questions, or concerns at this time.   Patient's mom appreciative of

## 2021-10-09 ENCOUNTER — HOSPITAL ENCOUNTER (OUTPATIENT)
Dept: CT IMAGING | Age: 21
Discharge: HOME | End: 2021-10-09
Payer: COMMERCIAL

## 2021-10-09 DIAGNOSIS — R10.31 ABDOMINAL PAIN, RIGHT LOWER QUADRANT: ICD-10-CM

## 2021-10-09 LAB — SARS-COV-2 ANTIBODY, TOTAL: NEGATIVE

## 2021-10-09 PROCEDURE — 6360000004 HC RX CONTRAST MEDICATION: Performed by: NURSE PRACTITIONER

## 2021-10-09 PROCEDURE — 74177 CT ABD & PELVIS W/CONTRAST: CPT

## 2021-10-09 RX ADMIN — IOPAMIDOL 100 ML: 755 INJECTION, SOLUTION INTRAVENOUS at 09:16

## 2021-10-09 RX ADMIN — IOHEXOL 50 ML: 240 INJECTION, SOLUTION INTRATHECAL; INTRAVASCULAR; INTRAVENOUS; ORAL at 09:16

## 2021-10-14 ENCOUNTER — CARE COORDINATION (OUTPATIENT)
Dept: OTHER | Facility: CLINIC | Age: 21
End: 2021-10-14
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
## 2021-10-19 ENCOUNTER — HOSPITAL ENCOUNTER (OUTPATIENT)
Dept: NEUROLOGY | Age: 21
Discharge: HOME | End: 2021-10-19
Payer: COMMERCIAL

## 2021-10-19 DIAGNOSIS — R41.82 ALTERED MENTAL STATUS, UNSPECIFIED ALTERED MENTAL STATUS TYPE: ICD-10-CM

## 2021-10-19 PROCEDURE — 95819 EEG AWAKE AND ASLEEP: CPT
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
## 2021-10-21 NOTE — PROCEDURES
[address] [place] [address]                          ELECTROENCEPHALOGRAM REPORT    PATIENT NAME: Cameron Yu                       :        2000  MED REC NO:   [id number]                           ROOM:  ACCOUNT NO:   [de-identified]                           ADMIT DATE: 10/19/2021  PROVIDER:     Bart Cardona. Clarita Campbell MD    DATE OF EEG:  10/19/2021    REFERRING PROVIDER:  Bridgette De Leon CNP    CLINICAL HISTORY:  A 54-year-old male presenting with altered mental  status, episodes of staring spells started several weeks ago, whole-body  jerking noted. The patient has history of anxiety, acid reflux, asthma,  cerebral palsy, chronic kidney disease, constipation, hemoptysis,  febrile seizures, hypertension, nephrolithiasis, PTSD, subglottic  stenosis. Medications listed are Linzess, levalbuterol, Botox, Maxzide, Pepcid,  Prinivil, vitamins, fluticasone, Folvite, baclofen, Tylenol. CLINICAL INTERPRETATION:  This is a 17-channel EEG performed without  sleep deprivation. Hyperventilation was not performed. Photic  stimulation was not performed. The patient is described as alert. Background rhythm activity was noted to be 8 to 9 Hz in the posterior  parietal area, symmetric. Frequent blink artifact and muscle artifact  were seen during the recording, limiting quality of data obtained. Hyperventilation was not performed. The patient was noted to be drowsy  during parts of recording. Frequent movement artifact was noted  limiting quality of data obtained. EKG tracing revealed an irregular  heart rate. The patient was noted to be asleep during parts of  recording. IMPRESSION:  This is a technically limited study due to the abundance of  artifact limiting quality of data obtained. There was no definitive  evidence of epileptiform activity appreciated throughout the study.   It  appears to be normal in the interpretable parts of recording. EKG tracing revealed an irregular heart rate. Clinical correlation is  recommended.         Irvin Contreras MD    D: 10/21/2021 11:08:02       T: 10/21/2021 11:10:31     [email or web address]  Job#: [id number]     Doc#: [id number]    CC:

## 2021-10-25 PROCEDURE — 95819 EEG AWAKE AND ASLEEP: CPT | Performed by: PSYCHIATRY & NEUROLOGY

## 2021-10-26 ENCOUNTER — INITIAL CONSULT (OUTPATIENT)
Dept: NEUROLOGY | Age: 21
End: 2021-10-26
Payer: COMMERCIAL

## 2021-10-26 VITALS
BODY MASS INDEX: 27.29 KG/M2 | DIASTOLIC BLOOD PRESSURE: 62 MMHG | WEIGHT: 139 LBS | HEART RATE: 80 BPM | SYSTOLIC BLOOD PRESSURE: 116 MMHG | HEIGHT: 60 IN | OXYGEN SATURATION: 98 %

## 2021-10-26 DIAGNOSIS — R55 SYNCOPE AND COLLAPSE: Primary | ICD-10-CM

## 2021-10-26 DIAGNOSIS — G93.40 ENCEPHALOPATHY: ICD-10-CM

## 2021-10-26 PROCEDURE — 99244 OFF/OP CNSLTJ NEW/EST MOD 40: CPT | Performed by: PSYCHIATRY & NEUROLOGY
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
## 2021-10-26 NOTE — CARE COORDINATION
Ambulatory Care Coordination Note  Late entry for 10/15/21  CM Risk Score: 5  Charlson 10 Year Mortality Risk Score: 2%     ACC: Luigi Noble RN    Summary Note: Spoke to patient's mother who reports patient contacted the [facility] for no apparent reason. Patient's mother is not able to obtain Respite from the Disability Board. Patient is not showering or taking meds. Patient has Telepscyh eval on 10/22/21. Patient is overeating to the point of vomiting. Patient's mom states Holly Mendez is working. Patient is not drinking water. Patient's bp meds being help due to sbp 130. Patient continues to state he is [place] adult now. \"  Per mom, muscle tightness noted. Patient's mother also states patient is being nosey now. Ambulatory Care Coordination Assessment    Care Coordination Protocol  Program Enrollment: Complex Care  Referral from Primary Care Provider: No  Week 1 - Initial Assessment     Do you have all of your prescriptions and are they filled?: Yes  Barriers to medication adherence: Other  Other barriers to medication adherence: Patient quit taking meds from [facility] within last month to six weeks  Are you able to afford your medications?: Yes  How often do you have trouble taking your medications the way you have been told to take them?: Sometimes I take them as prescribed. Do you have Home O2 Therapy?: No      Ability to seek help/take action for Emergent Urgent situations i.e. fire, crime, inclement weather or health crisis.: Needs Assistance  Ability to ambulate to restroom: Independent  Ability handle personal hygeine needs (bathing/dressing/grooming):  Independent  Ability to manage Medications: Needs Assistance  Ability to prepare Food Preparation: Needs Assistance  Ability to maintain home (clean home, laundry): Needs Assistance  Ability to drive and/or has transportation: Dependent  Ability to do shopping: Dependent  Ability to manage finances: Dependent  Is patient able to live independently?: No     Current Housing: Private Residence        Per the [address], did the patient have 2 or more falls or 1 fall with injury in the past year?: No  How often do you think you are about to fall and you do NOT fall? For example, you grab something to stabilize yourself or hold onto a wall/furniture?: Frequently  Use of a Mobility Aid: No  Difficulty walking/impaired gait: Yes  Issues with feet or shoes like numbness, edema, shoes not fitting: Yes  Changes in vision, poor vision or poor lighting in environment: No  Dizziness: Yes  Other Fall Risk: Yes  What other fall risks does the patient have?: muscle tightness due to CP     Frequent urination at night?: Yes  Do you use rails/bars?: No  Do you have a non-slip tub mat?: Yes     Are you experiencing loss of meaning?: No  Are you experiencing loss of hope and peace?: No     Thinking about your patient's physical health needs, are there any symptoms or problems (risk indicators) you are unsure about that require further investigation?: Moderate to severe symptoms or problems that impact on daily life   Are the patients physical health problems impacting on their mental well-being?: Moderate to severe impact upon mental well-being and preventing enjoyment of usual activities   Are there any problems with your patients lifestyle behaviors (alcohol, drugs, diet, exercise) that are impacting on physical or mental well-being?: Moderate to severe impact on well-being, preventing enjoyment of usual activities   Do you have any other concerns about your patients mental well-being?  How would you rate their severity and impact on the patient?: Moderate to severe problems that interfere with function   How would you rate their home environment in terms of safety and stability (including domestic violence, insecure housing, neighbor harassment)?: Consistently safe, supportive, stable, no identified problems   How do daily activities impact on the and only increase to full tablet if SBP remains > 140 in subsequent days.   Patient not taking: Reported on 9/23/2021 9/11/21   Benjamin Marquez,    onabotulinumtoxin A (BOTOX) 100 units injection Inject 100 Units into the muscle Every 4 months (last dose July 2021)  Used for leg spasms    Historical Provider, MD   lactulose (CHRONULAC) 10 GM/15ML solution Take 30 g by mouth daily   Patient not taking: Reported on 9/23/2021    Historical Provider, MD   famotidine (PEPCID) 20 MG tablet Take 20 mg by mouth daily    Historical Provider, MD   lisinopril (PRINIVIL;ZESTRIL) 30 MG tablet Take 15 mg by mouth daily as needed Mother giving pt 15 mg  Patient not taking: Reported on 9/23/2021    Historical Provider, MD   Multiple Vitamin (MULTI-VITAMIN DAILY PO) Take 1 tablet by mouth daily     Historical Provider, MD   NONFORMULARY Take 1 tablet by mouth 2 times daily Cerevive     Historical Provider, MD   FLUTICASONE PROPIONATE, NASAL, NA 1 spray by Nasal route daily as needed 1 spray each nostril daily     Historical Provider, MD   fluticasone (FLOVENT HFA) 110 MCG/ACT inhaler Inhale 2 puffs into the lungs 2 times daily    Historical Provider, MD   Levalbuterol Tartrate (XOPENEX HFA IN) Inhale 2 puffs into the lungs every 4-6 hours as needed   Patient not taking: Reported on 9/23/2021    Historical Provider, MD   baclofen (LIORESAL) 20 MG tablet Take 20 mg by mouth 2 times daily     Historical Provider, MD   acetaminophen (TYLENOL) 80 MG chewable tablet Take 80 mg by mouth every 4 hours as needed     Historical Provider, MD       Future Appointments   Date Time Provider Eliud Senior   10/26/2021 10:30 AM Lupe Rivero MD [facility] [address]   10/27/2021  7:45 AM MD Amna Boone [facility] [place]   11/4/2021  2:20 PM Dinh Weber MD [facility] [address] [facility]   3/22/2022  9:20 AM MD JOAN Longoria [place] [address]      and   General Assessment    Do you have any symptoms that are causing concern?: Yes  Progression since Onset: Gradually Worsening  Reported Symptoms: Other (Comment: decline in cognitive skills)

## 2021-10-27 ENCOUNTER — OFFICE VISIT (OUTPATIENT)
Dept: CARDIOLOGY CLINIC | Age: 21
End: 2021-10-27
Payer: COMMERCIAL

## 2021-10-27 ENCOUNTER — CARE COORDINATION (OUTPATIENT)
Dept: OTHER | Facility: CLINIC | Age: 21
End: 2021-10-27

## 2021-10-27 VITALS
HEIGHT: 60 IN | HEART RATE: 84 BPM | DIASTOLIC BLOOD PRESSURE: 62 MMHG | WEIGHT: 137 LBS | SYSTOLIC BLOOD PRESSURE: 120 MMHG | BODY MASS INDEX: 26.9 KG/M2

## 2021-10-27 DIAGNOSIS — I10 HYPERTENSION, UNSPECIFIED TYPE: Primary | ICD-10-CM

## 2021-10-27 PROCEDURE — 99213 OFFICE O/P EST LOW 20 MIN: CPT | Performed by: INTERNAL MEDICINE
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
## 2021-10-27 NOTE — CARE COORDINATION
Ambulatory Care Coordination Note  10/27/2021  CM Risk Score: 5  Charlson 10 Year Mortality Risk Score: 2%     ACC: Jyotsna Yanez, RN    Summary Note: ACM attempted to reach patient for follow up call. HIPAA compliant message left requesting a return phone call at patients convenience. Will continue to follow.        Future Appointments   Date Time Provider Eliud Nadeen   11/4/2021  2:20 PM Jaqui Gip, [address]   11/23/2021  7:30 AM STR MRI RM1 STRZ MRI STR Radiolog   11/23/2021  8:00 AM STR NEURODIAG ROOM 1 STRZ EEG [place] [facility]   11/23/2021  9:00 AM STR HM EXAM RM 01 STRZ EKG [place] [facility]   12/17/2021 11:00 AM LIVE Bansal - MAR N [facility] [place]   3/22/2022  9:20 AM Ivelisse Bonner MD [facility], [facility]. [facility] [place]   11/2/2022  7:45 AM MD Gracia Busch Hrt [address] [facility]

## 2021-10-27 NOTE — PROGRESS NOTES
Pt here for 4 week fu echo     Pt saw neurologist and is getting a 30 day event monitor ordered     Pt mother states med list is correct from yesterday appt    Pt continues with dizziness and lightheaded    B/P when checked at home is 102-138/60-82

## 2021-11-02 ENCOUNTER — CARE COORDINATION (OUTPATIENT)
Dept: OTHER | Facility: CLINIC | Age: 21
End: 2021-11-02
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
## 2021-11-02 NOTE — CARE COORDINATION
Ambulatory Care Coordination Note  11/2/2021  CM Risk Score: 5  Charlson 10 Year Mortality Risk Score: 2%     ACC: Margarita Moffett, RN    Summary Note: Patient's mom currently at work. This ACM to outreach patient's mom later in the day. Patient's mom agreeable to plan.       Future Appointments   Date Time Provider Eliud Montemayori   11/4/2021  2:20 PM Alexcamden Courtney, [address]   11/23/2021  7:30 AM STR MRI RM1 STRZ MRI STR Radiolog   11/23/2021  8:00 AM STR NEURODIAG ROOM 1 STRZ EEG [place] [facility]   11/23/2021  9:00 AM STR HM EXAM RM 01 STRZ EKG [place] [facility]   12/17/2021 11:00 AM LIVE Yoon - CNP N [facility] [place]   3/22/2022  9:20 AM Opal Santiago MD [facility], [facility]. [facility] [place]   11/2/2022  7:45 AM Giselle Dick MD [facility] [facility]

## 2021-11-04 ENCOUNTER — OFFICE VISIT (OUTPATIENT)
Dept: PULMONOLOGY | Age: 21
End: 2021-11-04
Payer: COMMERCIAL

## 2021-11-04 VITALS
SYSTOLIC BLOOD PRESSURE: 130 MMHG | HEIGHT: 60 IN | OXYGEN SATURATION: 98 % | TEMPERATURE: 98.2 F | BODY MASS INDEX: 26.9 KG/M2 | WEIGHT: 137 LBS | DIASTOLIC BLOOD PRESSURE: 82 MMHG | HEART RATE: 86 BPM

## 2021-11-04 DIAGNOSIS — J38.6 SGS (SUBGLOTTIC STENOSIS): ICD-10-CM

## 2021-11-04 DIAGNOSIS — R06.1 INTERMITTENT STRIDOR: ICD-10-CM

## 2021-11-04 DIAGNOSIS — J44.9 OBSTRUCTIVE LUNG DISEASE (GENERALIZED) (HCC): ICD-10-CM

## 2021-11-04 DIAGNOSIS — J98.4 CLD (CHRONIC LUNG DISEASE): Primary | ICD-10-CM

## 2021-11-04 DIAGNOSIS — M41.44 NEUROMUSCULAR SCOLIOSIS OF THORACIC REGION: ICD-10-CM

## 2021-11-04 DIAGNOSIS — Z98.890 HISTORY OF TRACHEOSTOMY: ICD-10-CM

## 2021-11-04 DIAGNOSIS — G47.33 OSA (OBSTRUCTIVE SLEEP APNEA): ICD-10-CM

## 2021-11-04 PROCEDURE — 99204 OFFICE O/P NEW MOD 45 MIN: CPT | Performed by: INTERNAL MEDICINE

## 2021-11-04 NOTE — PROGRESS NOTES
Subjective:      Patient ID: Brenda Jack is a 24 y.o. male. CC: CLD    HPI     Veronica is a young man who is transitioning his care to adult pulmonology as he turned 25 y/o. His pulmonary care has been delivered by Dr Esther Boykin and other associates at the [facility], last visit 9/21/21, visit interval usually every 6 mos, last hospital admit [facility] 9/24/21-- for dehydration. Condition stable, no needs today, review of extensive records in care everyehwere done. Veronica comes with his mother who is a RN who works for cardiac rehab, good historian. 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 Cosmos(9/21/21): %, FEV1: 56%, Ratio: 54%      ECHO:  FINDINGS:   ------------------------------------   Study Quality: Technically difficult echo due to poor             echocardiographic  windows. M-mode measurements are normal.   SVn:      There is normal systemic venous return to the right atrium. PVn:      The pulmonary veins are not adequately visualized. RA:       The right atrial size is normal. No abnormalities are             visualized  in the right atrium. LA:       The left atrial size is normal. No abnormalities are             visualized  in the left atrium. IAS:      The atrial septum is inadequately visualized. TV:       The tricuspid valve is normal without prolapse or             significant  insufficiency by Doppler interrogation. MV:       The mitral valve is normal without prolapse or significant             insufficiency  by Doppler interrogation.    RV:       There is a normal-sized right ventricle without hypertrophy,             and  ventricular systolic performance is normal.   LV:       There is a normal-sized left ventricle without hypertrophy,             and  ventricular systolic performance is normal.   IVS:      The ventricular septum is normally rounded and intact. PV:       The pulmonary valve is normal in motion and appearance and             the  Doppler flow velocity across the valve is normal.   AV:       The aortic valve is normal in motion and appearance and the             Doppler  flow velocity across the valve is normal.   PA:       The main pulmonary artery is normal.  The branch pulmonary             arteries  are confluent and normal-sized. The right             pulmonary Mary Beth Hoes is normal in size The left pulmonary             artery  is normal in size   AO:       There is a normal left-sided aortic arch without evidence of             coarctation.  Strap vessel anatomy is normal.   CA:       The left coronary artery is not adequately visualized. The            Willye Poplin artery is not adequately visualized. PE:       There is no pericardial effusion.   ------------------------------------   SUMMARY:   ------------------------------------   1.  Technically difficult study due to poor acoustic windows    2.  Of the structures visualized as noted above, no abnormalities   were noted    3.  Left ventricular mass is normal at 37 g/m2. 7.  Upper normal for   age and gender = 50 g/m2. 7     Signed 08/03/2018 09:54 AM   Jamin Velázquez MD      PSG:  Interpretation:   G47.33  Obstructive sleep apnea (pediatric)     This study reveals a mild degree of obstructive sleep apnea without significant oxygen desaturations or hypercarbia. No abnormal EEG activity, parasomnia or excessive periodic limb movements of sleep recorded. Recommendations:   Patient should undergo evaluation for surgical treatment of PILAR.  If surgical intervention is not indicated then a trial of intranasal corticosteroids or leukotriene modifier therapy exercise likely due to upper airway insufficiency, seems to benefit from long term ICS. No Need for refills at this time, Mom will call when needed. Plan:      Orders Placed This Encounter   Procedures    Full PFT Study With Bronchodilator     If an ABG is needed along with this PFT procedure, please place the appropriate lab order     Standing Status:   Future     Standing Expiration Date:   11/4/2022      Continue Flovent 110 2 inh bid, Xopenex PRN  May benefit from upper airway eval in the future.

## 2021-11-06 ENCOUNTER — HOSPITAL ENCOUNTER (OUTPATIENT)
Age: 21
Discharge: HOME | End: 2021-11-06
Payer: COMMERCIAL

## 2021-11-06 DIAGNOSIS — G93.40 ENCEPHALOPATHY: ICD-10-CM

## 2021-11-06 DIAGNOSIS — R55 SYNCOPE AND COLLAPSE: ICD-10-CM

## 2021-11-06 LAB
FOLATE: > 20 NG/ML (ref 4.8–24.2)
VITAMIN B-12: 1370 PG/ML (ref 211–911)

## 2021-11-06 PROCEDURE — 82607 VITAMIN B-12: CPT

## 2021-11-06 PROCEDURE — 36415 COLL VENOUS BLD VENIPUNCTURE: CPT

## 2021-11-06 PROCEDURE — 82746 ASSAY OF FOLIC ACID SERUM: CPT

## 2021-11-08 ENCOUNTER — CARE COORDINATION (OUTPATIENT)
Dept: OTHER | Facility: CLINIC | Age: 21
End: 2021-11-08
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
## 2021-11-18 ENCOUNTER — CARE COORDINATION (OUTPATIENT)
Dept: OTHER | Facility: CLINIC | Age: 21
End: 2021-11-18
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
## 2021-11-23 ENCOUNTER — HOSPITAL ENCOUNTER (OUTPATIENT)
Dept: NON INVASIVE DIAGNOSTICS | Age: 21
Discharge: HOME | End: 2021-11-23
Payer: COMMERCIAL

## 2021-11-23 ENCOUNTER — TELEPHONE (OUTPATIENT)
Dept: NEUROLOGY | Age: 21
End: 2021-11-23

## 2021-11-23 ENCOUNTER — HOSPITAL ENCOUNTER (OUTPATIENT)
Dept: MRI IMAGING | Age: 21
Discharge: HOME | End: 2021-11-23
Payer: COMMERCIAL

## 2021-11-23 ENCOUNTER — HOSPITAL ENCOUNTER (OUTPATIENT)
Dept: NEUROLOGY | Age: 21
Discharge: HOME | End: 2021-11-23
Payer: COMMERCIAL

## 2021-11-23 DIAGNOSIS — G93.40 ENCEPHALOPATHY: ICD-10-CM

## 2021-11-23 DIAGNOSIS — R55 SYNCOPE AND COLLAPSE: ICD-10-CM

## 2021-11-23 PROCEDURE — 70553 MRI BRAIN STEM W/O & W/DYE: CPT

## 2021-11-23 PROCEDURE — 6360000004 HC RX CONTRAST MEDICATION: Performed by: PSYCHIATRY & NEUROLOGY

## 2021-11-23 PROCEDURE — 95816 EEG AWAKE AND DROWSY: CPT

## 2021-11-23 PROCEDURE — A9579 GAD-BASE MR CONTRAST NOS,1ML: HCPCS | Performed by: PSYCHIATRY & NEUROLOGY

## 2021-11-23 PROCEDURE — 93270 REMOTE 30 DAY ECG REV/REPORT: CPT

## 2021-11-23 RX ADMIN — GADOTERIDOL 15 ML: 279.3 INJECTION, SOLUTION INTRAVENOUS at 07:53
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
## 2021-11-23 NOTE — PROCEDURES
The skin was prepped and a 30 day cardiac event monitor was applied. The patient was instructed on the documentation of symptoms and the purpose of the monitor as well as the things to avoid while wearing the monitor. The patient was instructed to remove and return the monitor on 12/23/2021.   The serial number of the monitor that was applied is [id number]

## 2021-11-23 NOTE — TELEPHONE ENCOUNTER
----- Message from Holly Clifford MD sent at 11/23/2021 11:17 AM EST -----  Please let patient Know MRI brain was normal, there is some sinus inflammation that he would benefit from follow up with his family Dr stockton.   Holly Clifford MD

## 2021-11-24 ENCOUNTER — OFFICE VISIT (OUTPATIENT)
Dept: FAMILY MEDICINE CLINIC | Age: 21
End: 2021-11-24
Payer: COMMERCIAL

## 2021-11-24 VITALS
OXYGEN SATURATION: 99 % | HEART RATE: 73 BPM | RESPIRATION RATE: 18 BRPM | HEIGHT: 60 IN | BODY MASS INDEX: 27.35 KG/M2 | TEMPERATURE: 97 F | DIASTOLIC BLOOD PRESSURE: 70 MMHG | WEIGHT: 139.3 LBS | SYSTOLIC BLOOD PRESSURE: 112 MMHG

## 2021-11-24 DIAGNOSIS — J01.00 ACUTE NON-RECURRENT MAXILLARY SINUSITIS: Primary | ICD-10-CM

## 2021-11-24 PROCEDURE — 99213 OFFICE O/P EST LOW 20 MIN: CPT | Performed by: NURSE PRACTITIONER

## 2021-11-24 RX ORDER — AMOXICILLIN AND CLAVULANATE POTASSIUM 875; 125 MG/1; MG/1
1 TABLET, FILM COATED ORAL 2 TIMES DAILY
Qty: 20 TABLET | Refills: 0 | Status: SHIPPED | OUTPATIENT
Start: 2021-11-24 | End: 2021-12-04

## 2021-11-24 SDOH — ECONOMIC STABILITY: FOOD INSECURITY: WITHIN THE PAST 12 MONTHS, THE FOOD YOU BOUGHT JUST DIDN'T LAST AND YOU DIDN'T HAVE MONEY TO GET MORE.: NEVER TRUE

## 2021-11-24 SDOH — ECONOMIC STABILITY: FOOD INSECURITY: WITHIN THE PAST 12 MONTHS, YOU WORRIED THAT YOUR FOOD WOULD RUN OUT BEFORE YOU GOT MONEY TO BUY MORE.: NEVER TRUE

## 2021-11-24 ASSESSMENT — PATIENT HEALTH QUESTIONNAIRE - PHQ9
SUM OF ALL RESPONSES TO PHQ QUESTIONS 1-9: 0
SUM OF ALL RESPONSES TO PHQ QUESTIONS 1-9: 0
1. LITTLE INTEREST OR PLEASURE IN DOING THINGS: 0
2. FEELING DOWN, DEPRESSED OR HOPELESS: 0
SUM OF ALL RESPONSES TO PHQ9 QUESTIONS 1 & 2: 0
SUM OF ALL RESPONSES TO PHQ QUESTIONS 1-9: 0

## 2021-11-24 ASSESSMENT — SOCIAL DETERMINANTS OF HEALTH (SDOH): HOW HARD IS IT FOR YOU TO PAY FOR THE VERY BASICS LIKE FOOD, HOUSING, MEDICAL CARE, AND HEATING?: NOT HARD AT ALL

## 2021-11-24 ASSESSMENT — ENCOUNTER SYMPTOMS
EYES NEGATIVE: 1
GASTROINTESTINAL NEGATIVE: 1
RESPIRATORY NEGATIVE: 1
SINUS PAIN: 1
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
## 2021-11-25 PROCEDURE — 95816 EEG AWAKE AND DROWSY: CPT | Performed by: PSYCHIATRY & NEUROLOGY

## 2021-11-25 NOTE — PROCEDURES
[address] [place] [address]                          ELECTROENCEPHALOGRAM REPORT    PATIENT NAME: Gilmer Alberts                       :        2000  MED REC NO:   [id number]                           ROOM:  ACCOUNT NO:   [de-identified]                           ADMIT DATE: 2021  PROVIDER:     Shae Egan MD    DATE OF EE2021    REFERRING PROVIDER:  Shae Egan MD    CLINICAL HISTORY:  A 61-year-old male presenting with syncope/collapse,  encephalopathy. The patient has history of hypertension, seizure, PTSD,  low blood pressure, palpitation, head trauma, cerebral palsy. Medications listed are Linzess, Maxzide, Botox, Zestril, multivitamins,  Flovent, Lioresal, Tylenol. CLINICAL INTERPRETATION:  This is a 17-channel EEG performed without  sleep deprivation. Hyperventilation and photic stimulation were not  performed. The patient is described as alert, slow to respond. The background rhythm activity is noted to be 8 to 9 Hz in the posterior  parietal area, symmetric, attenuates with eye opening. Frequent blink  artifact is noted limiting quality of data obtained. The patient is  noted to be drowsy during parts of recording. Head movement is noted  frequently limiting quality of data obtained. Photic stimulation was  not performed. There was no evidence of epileptiform activity  appreciated. No clinical seizures were observed. IMPRESSION:  This is a normal EEG. There was no evidence of  epileptiform activity appreciated.         Abena Butler MD    D: 2021 9:56:06       T: 2021 9:58:34     [id number]  Job#: [id number]     Doc#: [id number]    CC:

## 2021-11-26 ENCOUNTER — CARE COORDINATION (OUTPATIENT)
Dept: OTHER | Facility: CLINIC | Age: 21
End: 2021-11-26

## 2021-11-26 NOTE — CARE COORDINATION
Ambulatory Care Coordination Note  11/26/2021  CM Risk Score: 5  Charlson 10 Year Mortality Risk Score: 2%     ACC: Aguila Horn, RN    Summary Note: ACM attempted to reach patient for follow up call. HIPAA compliant message left requesting a return phone call at patients convenience. Will continue to follow.      Future Appointments   Date Time Provider Eliud Montemayori   12/17/2021 11:00 AM LIVE Guzman - CNP N [facility] - [place]   4/14/2022  9:40 AM MD JOAN Longoria [facility], [facility]. [facility] - [place]   5/3/2022  8:00 AM [facility] PULMONARY FUNCTION ROOM 1 [facility] PFT [place] [facility]   5/5/2022  8:40 AM Dinh Weber, [address]   11/2/2022  7:45 AM MD Amna Boone [facility] [address] [facility]

## 2021-12-01 ENCOUNTER — CARE COORDINATION (OUTPATIENT)
Dept: OTHER | Facility: CLINIC | Age: 21
End: 2021-12-01

## 2021-12-01 NOTE — CARE COORDINATION
Ambulatory Care Coordination Note  12/1/2021  CM Risk Score: 5  Charlson 10 Year Mortality Risk Score: 2%     ACC: Mena Miller, RN    Summary Note: ACM attempted to reach patient for follow up call. HIPAA compliant message left requesting a return phone call at patients convenience. Will continue to follow.        Future Appointments   Date Time Provider Eliud Senior   12/17/2021 11:00 AM LIVE Mares - CNP N [facility] - [place]   4/14/2022  9:40 AM MD JOAN Michelle [facility], [facility]. [facility] - [place]   5/3/2022  8:00 AM [facility] PULMONARY FUNCTION ROOM 1 STR PFT [place] [facility]   5/5/2022  8:40 AM Jose Crowell, [address]   11/2/2022  7:45 AM [facility] [address] [facility]

## 2021-12-02 ENCOUNTER — CARE COORDINATION (OUTPATIENT)
Dept: OTHER | Facility: CLINIC | Age: 21
End: 2021-12-02
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
## 2021-12-10 ENCOUNTER — CARE COORDINATION (OUTPATIENT)
Dept: OTHER | Facility: CLINIC | Age: 21
End: 2021-12-10

## 2021-12-10 NOTE — PROCEDURES
[address] [place] [address]                                 EVENT MONITOR    PATIENT NAME: Valentin Bonner                       :        2000  MED REC NO:   [id number]                           ROOM:  ACCOUNT NO:   [de-identified]                           ADMIT DATE: 2021  PROVIDER:     Tamiko Hayes M.D. CLINICAL HISTORY AND INDICATION:  This is a patient with syncope. EVENT MONITOR DESCRIPTION:  Event monitor was attached to the patient  between 2021 and 2021. EVENT MONITOR FINDINGS:  Baseline rhythm showed sinus rhythm with rare  PACs, episodes of sinus tachycardia and sinus bradycardia were noted,  which were likely physiologic. Otherwise, no arrhythmias detected on  the CPAP monitor. CONCLUSION:  Unremarkable event monitor with no significant arrhythmias.         Kalyan Kaur M.D.    D: 12/10/2021 7:24:35       T: 12/10/2021 8:06:14     [id number]  Job#: [id number]     Doc#: [id number]    CC:
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
## 2021-12-14 ENCOUNTER — CARE COORDINATION (OUTPATIENT)
Dept: OTHER | Facility: CLINIC | Age: 21
End: 2021-12-14

## 2021-12-14 RX ORDER — ESCITALOPRAM OXALATE 10 MG/1
10 TABLET ORAL DAILY
COMMUNITY
End: 2022-02-09

## 2021-12-14 NOTE — CARE COORDINATION
Ambulatory Care Coordination Note  12/14/2021  CM Risk Score: 5  Charlson 10 Year Mortality Risk Score: 2%     ACC: Harinder Browne RN    Summary Note: Spoke to patient's mother who states patient completed virtual visit with psychiatry on 12/11/21 and prescribed Lexapron 10 mg daily. Per vm from patient's mom, will  rx 12/14/21 and begin. Patient is also taking Baclofen tid. Discussed compliance with meds in order to have positive results. Patient is better about taking meds per mother. Patient had PT/OT today. Patient's mom had to supervise bathing because patient is getting in shower but not cleaning self and air. Patient continues to tell mom \"I'm an adult. \"  Patient is reportedly more irritated with mom on days she works. Patient continues to eat excessively. Patient has appt with Neurology on 12/17/21 and virtual visit with psychiatry on 1/15/21. Patient denies any further needs, questions, or concerns at this time. Patient is agreeable to future follow up calls. Care Coordination Interventions    Program Enrollment: Complex Care  Referral from Primary Care Provider: No  Suggested Interventions and Community Resources  No Identified Needs  Pharmacist: Declined  Physical Therapy: In Process  Other Therapy Services:  In Process (Comment: Mental/Behavioral health)  Zone Management Tools: Not Started         Future Appointments   Date Time Provider Eliud Senior   12/17/2021  3:15 PM Weston Kraft MD N [facility] - [facility]   4/14/2022  9:40 AM Christella Gilford, MD N [facility]. [facility] - [facility]   5/3/2022  8:00 AM STR PULMONARY FUNCTION ROOM 1 [facility] PFT [facility]   5/5/2022  8:40 AM Myriam Toney, [address]   11/2/2022  7:45 AM Amy De Leon MD N [facility] [address]      and   General Assessment    Do you have any symptoms that are causing concern?: No

## 2021-12-23 ENCOUNTER — OFFICE VISIT (OUTPATIENT)
Dept: NEUROLOGY | Age: 21
End: 2021-12-23
Payer: COMMERCIAL

## 2021-12-23 VITALS
HEART RATE: 111 BPM | WEIGHT: 143 LBS | BODY MASS INDEX: 28.07 KG/M2 | OXYGEN SATURATION: 97 % | HEIGHT: 60 IN | SYSTOLIC BLOOD PRESSURE: 140 MMHG | DIASTOLIC BLOOD PRESSURE: 82 MMHG

## 2021-12-23 DIAGNOSIS — G93.40 ENCEPHALOPATHY: ICD-10-CM

## 2021-12-23 DIAGNOSIS — R55 SYNCOPE AND COLLAPSE: Primary | ICD-10-CM

## 2021-12-23 PROCEDURE — 99214 OFFICE O/P EST MOD 30 MIN: CPT | Performed by: PSYCHIATRY & NEUROLOGY

## 2021-12-23 NOTE — PROGRESS NOTES
NEUROLOGY OUT PATIENT FOLLOW UP NOTE:  12/23/202112:00 PM    Hyun Preciado is here for follow up for   Patient Active Problem List   Diagnosis    Hypotension    Uremia, acute    FOZIA (acute kidney injury) ([facility] [address]      Follow up for syncope, encephalopathy. He denies new events and comes in with family to go over plan. Allergies   Allergen Reactions    Cephalosporins Dermatitis     Lethargic, red ears       Current Outpatient Medications:     escitalopram (LEXAPRO) 10 MG tablet, Take 10 mg by mouth daily, Disp: , Rfl:     linaCLOtide (LINZESS) 72 MCG CAPS capsule, Take 72 mcg by mouth 2 times daily as needed , Disp: , Rfl:     levalbuterol (XOPENEX) 1.25 MG/0.5ML nebulizer solution, Take 1 ampule by nebulization every 4 hours as needed for Wheezing , Disp: , Rfl:     triamterene-hydroCHLOROthiazide (MAXZIDE) 75-50 MG per tablet, Take 1 tablet by mouth daily Hold until SBP > 140, start with 1/2 tablet and only increase to full tablet if SBP remains > 140 in subsequent days. , Disp: 30 tablet, Rfl: 3    onabotulinumtoxin A (BOTOX) 100 units injection, Inject 100 Units into the muscle Every 4 months (last dose July 2021) Used for leg spasms, Disp: , Rfl:     famotidine (PEPCID) 20 MG tablet, Take 20 mg by mouth daily, Disp: , Rfl:     Multiple Vitamin (MULTI-VITAMIN DAILY PO), Take 1 tablet by mouth daily , Disp: , Rfl:     NONFORMULARY, Take 1 tablet by mouth 2 times daily Cerevive , Disp: , Rfl:     FLUTICASONE PROPIONATE, NASAL, NA, 1 spray by Nasal route daily as needed 1 spray each nostril daily , Disp: , Rfl:     fluticasone (FLOVENT HFA) 110 MCG/ACT inhaler, Inhale 2 puffs into the lungs 2 times daily, Disp: , Rfl:     Levalbuterol Tartrate (XOPENEX HFA IN), Inhale 2 puffs into the lungs every 4-6 hours as needed , Disp: , Rfl:     baclofen (LIORESAL) 20 MG tablet, Take 20 mg by mouth 3 times daily , Disp: , Rfl:     acetaminophen (TYLENOL) 80 MG chewable tablet, Take 80 mg by mouth every 4 hours as needed , Disp: , Rfl:     I reviewed the past medical history, allergies, medications, social history and family history. PE:   Vitals:    12/23/21 1153   BP: (!) 140/82   Site: Left Upper Arm   Position: Sitting   Cuff Size: Medium Adult   Pulse: 111   SpO2: 97%   Weight: 143 lb (64.9 kg)   Height: 5' (1.524 m)     General Appearance:  alert and cooperative, he is wearing mask. Mental state is intact. Skin:  Skin color, texture, turgor normal. No rashes or lesions. Gen: NAD, Language is Intact. Skin: no rash, lesion,  moist to touch. warm  Head: no rash, no icterus  Neck: There is no carotid bruits. The Neck is supple. There is no neck lymphadenopathy. Neuro: CN 2-12 grossly intact with no focal deficits. Power 5/5 Throughout symmetric, increased lower extremity tone noted. Reflexes are +2 symmetric. Long tracts are intact. Cerebellar exam is Intact. Sensory exam is intact to light touch. Gait is intact. Musculoskeletal:  Has no hand arthritis, no limitation of ROM in any of the four extremities. Lower extremities no edema          DATA:        Results for orders placed or performed during the hospital encounter of 11/06/21   Vitamin B12 & Folate   Result Value Ref Range    Vitamin B-12 1370 (H) 211 - 911 pg/mL    Folate > 20.0 4.8 - 24.2 ng/mL            MRI BRAIN W WO CONTRAST (reviewed)    Narrative  PROCEDURE: MRI BRAIN W WO CONTRAST  CLINICAL INFORMATIONSyncope and collapse, Encephalopathy. COMPARISON: CT scan of the brain dated 14 September 2021. TECHNIQUE: Multiplanar and multiple spin echo T1 and T2-weighted images were obtained through the brain before and after the administration of intravenous contrast.  FINDINGS:  The diffusion-weighted images are normal. The brain volume is normal.There are no intra-or extra-axial collections. There is no hydrocephalus, midline shift or mass effect. On the FLAIR and T2-weighted sequences, there is normal signal intensity in the brain.   On the gradient echo T2-weighted images, there is mineralization in the medial aspects of the basal ganglia. No other areas of susceptibility artifact are present. There is no abnormal enhancement in the brain. The major intracranial vascular flow voids are present. The midline craniocervical junction structures are normal.  The brainstem and pituitary gland are normal.  There is increased soft tissue signal intensity in nasopharynx consistent with enlarged adenoids. There is increased signal intensity in the left maxillary sinus consistent with inflammatory changes. Impression  1. Negative MRI scan of the brain with and without intravenous contrast. No structural abnormality noted. 2. Enlarged adenoids. 3. Inflammatory changes in the left maxillary sinus. .  **This report has been created using voice recognition software. It may contain minor errors which are inherent in voice recognition technology. **  Final report electronically signed by DR Shellye Ahumada on 11/23/2021 10:24 AM      CT Head WO Contrast  Narrative  PROCEDURE: CT HEAD WO CONTRAST  CLINICAL INFORMATION: syncopal episode. COMPARISON: No prior study. TECHNIQUE: Noncontrast 5 mm axial images were obtained through the brain. Sagittal and coronal reconstructions were created. All CT scans at this facility use dose modulation, iterative reconstruction, and/or weight-based dosing when appropriate to reduce radiation dose to as low as reasonably achievable. FINDINGS:  The ventricles, cisterns and sulci are symmetric and normal in size and configuration. Gray-white matter differentiation appears grossly preserved. No intracranial hemorrhage, mass effect or midline shift is identified. The calvarium appears intact. Orbits are unremarkable. Paranasal sinuses and mastoid air cells are clear. Impression  No evidence of acute intracranial abnormality. **This report has been created using voice recognition software.  It may contain minor errors which are inherent in voice recognition technology. **  Final report electronically signed by Dr. Socorro Hickey MD on 9/14/2021 10:27 AM    EEG:  IMPRESSION:  This is a normal EEG. There was no evidence of  epileptiform activity appreciated. Selby Simmonds, MD  D: 11/25/2021 9:56:06      Event monitor: 12/2021 (partial)  CONCLUSION:  Unremarkable event monitor with no significant arrhythmias. Sumit Hicks M.D.  D: 12/10/2021 7:24:35        Assessment:     Diagnosis Orders   1. Syncope and collapse     2. Encephalopathy        Follow up for syncope, encephalopathy. The patient denies any new events. I reviewed the MRI Brain and agree with interpretation, I also reviewed the patient pertinent labs and records in the EHR and from other providers. MRI brain was normal, there is some sinus inflammation, he was treated for sinusitis by his family physician. The B12 and folate were normal. EEG was normal 11/23/2021. Event monitor was unremarkable 12/2021. The patient reportedly has history is significant for acute kidney injury, kidney stones, hypertension at extremely young age. He had syncopal episode. History is significant for PTSD. He has heart palpitations. No shortness of breath or chest pain. He had low blood pressure during episodes was 80/40. I discussed with the patient and his mother accompanying him during today's evaluation the results of testing obtained there were unremarkable from neurology standpoint. It is possible that he became symptomatic with hypotension that has since been addressed where his blood pressure medications have been discontinued. The patient denies any new events, he has followed up with his family physician about being placed on medications to help with sinusitis. He is also been placed on depression medications. We discussed the option of obtaining a tilt table study to further evaluate the possibility of his symptoms during the related to low blood pressure.  At this time the patient and his mother are not interested in pursuing that. If this changes, this can be ordered electively. The patient and his mother are pleased with his progress After detailed discussion with patient and family we agreed on the following plan. Plan:  1. Call with any new symptoms or concerns. 2. Follow up as needed.        Total time 32 min    Ana Laura Casarez MD

## 2021-12-28 ENCOUNTER — CARE COORDINATION (OUTPATIENT)
Dept: OTHER | Facility: CLINIC | Age: 21
End: 2021-12-28

## 2021-12-28 NOTE — CARE COORDINATION
Ambulatory Care Coordination Note  12/28/2021  CM Risk Score: 5  Charlson 10 Year Mortality Risk Score: 2%     ACC: Alberto Black, RN    Summary Note: Spoke to patient's mother via phone for follow up call. Per patient's mother, patient began taking Celexa. Per mom, masturbation noted, voicing concerns of patient being hypersexual due to medication. Patient's mom denies patient is acting sexually inappropriate toward others to her knowledge. Per mom, poor hygiene, stealing, hitting, calling people at work, excessive eating continues. Parents have to remind patient to take medications. Discussed setting an alarm for patient. Patient's mom to contact disability board after 1/1/2022 regarding [place] instead of [place]. Per patient's mom, [employer] [facility] is a workshop for the disabled, with more interactions. Transportation would be provided to and from workshop. Patient's bp has been good, no need for medication. Patient's next VV with Psychiatry is on 1/15/22. Patient's mother denies any further needs, questions, or concerns at this time. Patient's mother is agreeable to future follow up calls. Care Coordination Interventions    Program Enrollment: Complex Care  Referral from Primary Care Provider: No  Suggested Interventions and Community Resources  No Identified Needs  Pharmacist: Declined  Physical Therapy: In Process  Other Therapy Services:  In Process (Comment: Mental/Behavioral health)  Zone Management Tools: Not Started         Future Appointments   Date Time Provider Eliud Senior   4/14/2022  9:40 AM Deysi Grady MD [facility], [facility]. [facility] [place]   5/3/2022  8:00 AM STR PULMONARY FUNCTION ROOM 1 STR PFT [place] [facility]   5/5/2022  8:40 AM Thomas Zuñiga, [address]   11/2/2022  7:45 AM Frederick Penaloza MD [facility] [address]      and   General Assessment    Do you have any symptoms that are causing concern?: Yes  Progression since Onset: Unchanged  Reported Symptoms: (Comment: behavioral)

## 2022-01-14 ENCOUNTER — CARE COORDINATION (OUTPATIENT)
Dept: OTHER | Facility: CLINIC | Age: 22
End: 2022-01-14
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
## 2022-01-21 ENCOUNTER — CARE COORDINATION (OUTPATIENT)
Dept: OTHER | Facility: CLINIC | Age: 22
End: 2022-01-21

## 2022-01-21 NOTE — CARE COORDINATION
Ambulatory Care Coordination Note  1/21/2022  CM Risk Score: 5  Charlson 10 Year Mortality Risk Score: 2%     ACC: Alonzo Yanez, RN    Summary Note: Contacted patient's mom for follow up call. Patient's mother is currently walking into calling hours and will return call to this ACM at her convenience. Will continue to follow patient.     Future Appointments   Date Time Provider Eliud Senior   4/14/2022  9:40 AM Alona Garcia MD [facility]. [facility] - [address] [facility]   5/3/2022  8:00 AM STR PULMONARY FUNCTION ROOM 1 STR[facility] PFT [address] [facility]   5/5/2022  8:40 AM Sandralee Drown, [address]   11/2/2022  7:45 AM [facility]ward MD [facility] [address] [facility]
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
## 2022-02-04 ENCOUNTER — CARE COORDINATION (OUTPATIENT)
Dept: OTHER | Facility: CLINIC | Age: 22
End: 2022-02-04

## 2022-02-04 RX ORDER — GUANFACINE 1 MG/1
1 TABLET ORAL DAILY
COMMUNITY
End: 2022-04-14 | Stop reason: ALTCHOICE
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
## 2022-02-08 ENCOUNTER — HOSPITAL ENCOUNTER (OUTPATIENT)
Age: 22
Discharge: HOME | End: 2022-02-08
Payer: COMMERCIAL

## 2022-02-08 LAB
ALBUMIN SERPL-MCNC: 4.8 G/DL (ref 3.5–5.1)
ALP BLD-CCNC: 107 U/L (ref 38–126)
ALT SERPL-CCNC: 45 U/L (ref 11–66)
ANION GAP SERPL CALCULATED.3IONS-SCNC: 16 MEQ/L (ref 8–16)
AST SERPL-CCNC: 34 U/L (ref 5–40)
AVERAGE GLUCOSE: 105 MG/DL (ref 70–126)
BILIRUB SERPL-MCNC: 0.7 MG/DL (ref 0.3–1.2)
BUN BLDV-MCNC: 17 MG/DL (ref 7–22)
CALCIUM SERPL-MCNC: 10.2 MG/DL (ref 8.5–10.5)
CHLORIDE BLD-SCNC: 103 MEQ/L (ref 98–111)
CHOLESTEROL, TOTAL: 173 MG/DL (ref 100–199)
CO2: 23 MEQ/L (ref 23–33)
CREAT SERPL-MCNC: 0.5 MG/DL (ref 0.4–1.2)
ERYTHROCYTE [DISTWIDTH] IN BLOOD BY AUTOMATED COUNT: 14.4 % (ref 11.5–14.5)
ERYTHROCYTE [DISTWIDTH] IN BLOOD BY AUTOMATED COUNT: 48.6 FL (ref 35–45)
FOLATE: > 20 NG/ML (ref 4.8–24.2)
GFR SERPL CREATININE-BSD FRML MDRD: > 90 ML/MIN/1.73M2
GLUCOSE BLD-MCNC: 84 MG/DL (ref 70–108)
HBA1C MFR BLD: 5.5 % (ref 4.4–6.4)
HCT VFR BLD CALC: 48.7 % (ref 42–52)
HDLC SERPL-MCNC: 29 MG/DL
HEMOGLOBIN: 15.7 GM/DL (ref 14–18)
LDL CHOLESTEROL CALCULATED: 126 MG/DL
MCH RBC QN AUTO: 29.6 PG (ref 26–33)
MCHC RBC AUTO-ENTMCNC: 32.2 GM/DL (ref 32.2–35.5)
MCV RBC AUTO: 91.9 FL (ref 80–94)
PLATELET # BLD: 286 THOU/MM3 (ref 130–400)
PMV BLD AUTO: 11 FL (ref 9.4–12.4)
POTASSIUM SERPL-SCNC: 4.3 MEQ/L (ref 3.5–5.2)
RBC # BLD: 5.3 MILL/MM3 (ref 4.7–6.1)
SODIUM BLD-SCNC: 142 MEQ/L (ref 135–145)
TOTAL PROTEIN: 7.7 G/DL (ref 6.1–8)
TRIGL SERPL-MCNC: 91 MG/DL (ref 0–199)
VITAMIN B-12: 906 PG/ML (ref 211–911)
WBC # BLD: 4.6 THOU/MM3 (ref 4.8–10.8)

## 2022-02-08 PROCEDURE — 82746 ASSAY OF FOLIC ACID SERUM: CPT

## 2022-02-08 PROCEDURE — 82607 VITAMIN B-12: CPT

## 2022-02-08 PROCEDURE — 36415 COLL VENOUS BLD VENIPUNCTURE: CPT

## 2022-02-08 PROCEDURE — 85027 COMPLETE CBC AUTOMATED: CPT

## 2022-02-08 PROCEDURE — 80053 COMPREHEN METABOLIC PANEL: CPT

## 2022-02-08 PROCEDURE — 83036 HEMOGLOBIN GLYCOSYLATED A1C: CPT

## 2022-02-08 PROCEDURE — 80061 LIPID PANEL: CPT

## 2022-02-09 ENCOUNTER — CARE COORDINATION (OUTPATIENT)
Dept: OTHER | Facility: CLINIC | Age: 22
End: 2022-02-09
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
## 2022-02-23 ENCOUNTER — CARE COORDINATION (OUTPATIENT)
Dept: OTHER | Facility: CLINIC | Age: 22
End: 2022-02-23

## 2022-02-23 RX ORDER — RISPERIDONE 2 MG/1
TABLET, FILM COATED ORAL NIGHTLY
COMMUNITY
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
## 2022-03-04 ENCOUNTER — HOSPITAL ENCOUNTER (OUTPATIENT)
Age: 22
Discharge: HOME | End: 2022-03-04
Payer: COMMERCIAL

## 2022-03-04 ENCOUNTER — HOSPITAL ENCOUNTER (OUTPATIENT)
Dept: GENERAL RADIOLOGY | Age: 22
Discharge: HOME | End: 2022-03-04
Payer: COMMERCIAL

## 2022-03-04 ENCOUNTER — OFFICE VISIT (OUTPATIENT)
Dept: FAMILY MEDICINE CLINIC | Age: 22
End: 2022-03-04
Payer: COMMERCIAL

## 2022-03-04 VITALS
WEIGHT: 139 LBS | RESPIRATION RATE: 20 BRPM | BODY MASS INDEX: 27.15 KG/M2 | SYSTOLIC BLOOD PRESSURE: 118 MMHG | HEART RATE: 89 BPM | DIASTOLIC BLOOD PRESSURE: 70 MMHG | OXYGEN SATURATION: 98 % | TEMPERATURE: 98.7 F

## 2022-03-04 DIAGNOSIS — J44.9 OBSTRUCTIVE LUNG DISEASE (GENERALIZED) (HCC): ICD-10-CM

## 2022-03-04 DIAGNOSIS — S93.601A SPRAIN OF RIGHT FOOT, INITIAL ENCOUNTER: ICD-10-CM

## 2022-03-04 DIAGNOSIS — G80.9 CEREBRAL PALSY, UNSPECIFIED TYPE (HCC): Primary | ICD-10-CM

## 2022-03-04 PROCEDURE — 73630 X-RAY EXAM OF FOOT: CPT

## 2022-03-04 PROCEDURE — 73610 X-RAY EXAM OF ANKLE: CPT

## 2022-03-04 PROCEDURE — 99213 OFFICE O/P EST LOW 20 MIN: CPT | Performed by: NURSE PRACTITIONER

## 2022-03-04 ASSESSMENT — ENCOUNTER SYMPTOMS
GASTROINTESTINAL NEGATIVE: 1
RESPIRATORY NEGATIVE: 1
EYES NEGATIVE: 1
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
## 2022-03-09 ENCOUNTER — CARE COORDINATION (OUTPATIENT)
Dept: OTHER | Facility: CLINIC | Age: 22
End: 2022-03-09

## 2022-03-09 NOTE — CARE COORDINATION
Ambulatory Care Coordination Note  3/9/2022  CM Risk Score: 5  Charlson 10 Year Mortality Risk Score: 23%     ACC: Alma Salvador RN    Summary Note: 101[facility] ([facility]) contacted the patient's mother by telephone to follow up on progress, discuss new issues or concerns, and reinforce/ provide patient education. Per patent's mother, patient started at \[employer]"" 66 this week and patient misses his friend from [facility]. Beginning next week, patient will attend [facility] on Mondays, Thursdays, Fridays and [employer] [facility] on Tuesdays and Wednesdays. Patient's mother to attempt to schedule social gatherings with An Gregory as well. Patient is taking meds as directed and bathing. Appropriate eating noted by mother. Patient's mother states Tenex was to be administered bid not nightly, now giving bid. Patient was sleeping better until MORGAN discontinued. Patient has not needed bp meds. Plan:  Continue biweekly outreaches to provide telephonic support, education and resources as needed. Discuss / follow up on: progress with behavioral health. Patient's mother verbalized understanding and is agreeable to follow up contact. Ambulatory Care Coordination Assessment    Do you have all of your prescriptions and are they filled?: Yes  Barriers to medication adherence: None  Are you able to afford your medications?: Yes  How often do you have trouble taking your medications the way you have been told to take them?: I always take them as prescribed. Care Coordination Interventions    Program Enrollment: Complex Care  Referral from Primary Care Provider: No  Suggested Interventions and Community Resources  Pharmacist: Declined  Physical Therapy: In Process  Other Therapy Services:  In Process (Comment: Mental/Behavioral health)  Zone Management Tools: Not Started         Future Appointments   Date Time Provider Eliud Senior   4/14/2022  9:40 AM MD JOAN [facility]. [facility] - [facility]   5/3/2022  8:00 AM STR PULMONARY FUNCTION ROOM 1 [facility] PFT [facility]   5/5/2022  8:40 AM Dipti Duarte, [address]   11/2/2022  7:45 AM MD Edwin Ortizeron [facility] [address]      and   General Assessment    Do you have any symptoms that are causing concern?: No

## 2022-03-23 ENCOUNTER — CARE COORDINATION (OUTPATIENT)
Dept: OTHER | Facility: CLINIC | Age: 22
End: 2022-03-23
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
## 2022-03-30 ENCOUNTER — HOSPITAL ENCOUNTER (OUTPATIENT)
Age: 22
Discharge: HOME | End: 2022-03-30
Payer: COMMERCIAL

## 2022-03-30 LAB
ANION GAP: 8 MEQ/L (ref 8–16)
BUN BLDV-MCNC: 20 MG/DL (ref 7–18)
CHLORIDE BLD-SCNC: 105 MEQ/L (ref 98–107)
CO2: 26 MEQ/L (ref 21–32)
CREAT SERPL-MCNC: 0.7 MG/DL (ref 0.6–1.3)
GFR, ESTIMATED: > 90 ML/MIN/1.73M2
GLUCOSE BLD-MCNC: 104 MG/DL (ref 74–106)
POC CALCIUM: 9 MG/DL (ref 8.5–10.1)
POTASSIUM SERPL-SCNC: 4 MEQ/L (ref 3.5–5.1)
SODIUM BLD-SCNC: 139 MEQ/L (ref 136–145)

## 2022-03-30 PROCEDURE — 36415 COLL VENOUS BLD VENIPUNCTURE: CPT

## 2022-03-30 PROCEDURE — 84156 ASSAY OF PROTEIN URINE: CPT

## 2022-03-30 PROCEDURE — 82570 ASSAY OF URINE CREATININE: CPT

## 2022-03-30 PROCEDURE — 82043 UR ALBUMIN QUANTITATIVE: CPT

## 2022-03-30 PROCEDURE — 80048 BASIC METABOLIC PNL TOTAL CA: CPT

## 2022-03-31 LAB
CREATININE URINE: 155.5 MG/DL
CREATININE, URINE: 155.5 MG/DL
MICROALBUMIN UR-MCNC: < 1.2 MG/DL
MICROALBUMIN/CREAT UR-RTO: 8 MG/G (ref 0–30)
PROTEIN, URINE: 8 MG/DL

## 2022-04-06 ENCOUNTER — CARE COORDINATION (OUTPATIENT)
Dept: OTHER | Facility: CLINIC | Age: 22
End: 2022-04-06
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
## 2022-04-14 ENCOUNTER — OFFICE VISIT (OUTPATIENT)
Dept: NEPHROLOGY | Age: 22
End: 2022-04-14
Payer: COMMERCIAL

## 2022-04-14 ENCOUNTER — CARE COORDINATION (OUTPATIENT)
Dept: OTHER | Facility: CLINIC | Age: 22
End: 2022-04-14

## 2022-04-14 VITALS
WEIGHT: 138.8 LBS | OXYGEN SATURATION: 100 % | DIASTOLIC BLOOD PRESSURE: 86 MMHG | BODY MASS INDEX: 27.11 KG/M2 | HEART RATE: 98 BPM | SYSTOLIC BLOOD PRESSURE: 135 MMHG

## 2022-04-14 DIAGNOSIS — I10 ESSENTIAL HYPERTENSION: ICD-10-CM

## 2022-04-14 DIAGNOSIS — N20.0 NEPHROLITHIASIS: Primary | ICD-10-CM

## 2022-04-14 PROCEDURE — 99213 OFFICE O/P EST LOW 20 MIN: CPT | Performed by: INTERNAL MEDICINE

## 2022-04-14 NOTE — CARE COORDINATION
Ambulatory Care Coordination Note  4/14/2022  CM Risk Score: 5  Charlson 10 Year Mortality Risk Score: 23%     ACC: Jilda Holstein, RN    Summary Note: 1015 [facility] ([facility]) contacted the patient's mother by telephone to follow up on progress, discuss new issues or concerns, and reinforce/ provide patient education. Patient's mother reports patient is doing okay. Patient followed up with nephrology and instructed to hold bp meds at home based on bp. Patient's mother has been following these instructions. Patient received Botox last Wednesday. Patient as appts with PT monthly, Psychiatry monthly, Behavioral Therapist, and attends horse back riding weekly when in session. Patient is going to [facility] three times per week and [facility] twice weekly. Patient recently attended a dance. Due to staffing issues, patient is not receiving aide service 4 hours per week. Patient is bathing, with intermittent reminders. Patient is brushing teeth, cleaning retainer, helping with dishes and compliant with meds. Reinforced/ Provided Education:  Discussed red flags and appropriate site of care based on symptoms and resources available to patient including: PCP  Specialist  [employer] Messaging. Importance and benefits of: Follow up with PCP and specialist, medication adherence, self monitoring and reporting of symptoms. Plan:  Patient graduated       Care Coordination Interventions    Program Enrollment: Complex Care  Referral from Primary Care Provider: No  Suggested Interventions and Community Resources  No Identified Needs  Pharmacist: Declined  Physical Therapy: In Process  Other Therapy Services: In Process (Comment: Mental/Behavioral health)  Zone Management Tools: Not Started         Prior to Admission medications    Medication Sig Start Date End Date Taking?  Authorizing Provider   risperiDONE (RISPERDAL) 2 MG tablet Take by mouth nightly    Yes Historical Provider, MD   linaCLOtide Josr Serene) 72 MCG CAPS capsule Take 72 mcg by mouth 2 times daily as needed    Yes Historical Provider, MD   onabotulinumtoxin A (BOTOX) 100 units injection Inject 100 Units into the muscle Every 4 months (last dose July 2021)  Used for leg spasms   Yes Historical Provider, MD   Famotidine (PEPCID PO) Take 10 mg by mouth daily    Yes Historical Provider, MD   Multiple Vitamin (MULTI-VITAMIN DAILY PO) Take 1 tablet by mouth daily    Yes Historical Provider, MD   NONFORMULARY Take 1 tablet by mouth 2 times daily Cerevive    Yes Historical Provider, MD   fluticasone (FLOVENT HFA) 110 MCG/ACT inhaler Inhale 2 puffs into the lungs 2 times daily   Yes Historical Provider, MD   Levalbuterol Tartrate (XOPENEX HFA IN) Inhale 2 puffs into the lungs every 4-6 hours as needed    Yes Historical Provider, MD   baclofen (LIORESAL) 20 MG tablet Take 20 mg by mouth 3 times daily    Yes Historical Provider, MD   ACETAMINOPHEN PO Take 325 mg by mouth every 4 hours as needed    Yes Historical Provider, MD       Future Appointments   Date Time Provider Eliud Montemayori   5/3/2022  8:00 AM [facility] PULMONARY FUNCTION ROOM 1 [facility] [place]   5/19/2022  2:00 PM Sobeida Brink, [address]   11/2/2022  7:45 AM [facility]   4/13/2023  8:40 AM MD JOAN Jane [place] [address]      and   General Assessment    Do you have any symptoms that are causing concern?: No
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
## 2022-05-03 ENCOUNTER — HOSPITAL ENCOUNTER (OUTPATIENT)
Dept: PULMONOLOGY | Age: 22
Discharge: HOME | End: 2022-05-03
Payer: COMMERCIAL

## 2022-05-03 DIAGNOSIS — J98.4 CLD (CHRONIC LUNG DISEASE): ICD-10-CM

## 2022-05-03 PROCEDURE — 94060 EVALUATION OF WHEEZING: CPT

## 2022-05-03 PROCEDURE — 94729 DIFFUSING CAPACITY: CPT

## 2022-05-03 PROCEDURE — 94726 PLETHYSMOGRAPHY LUNG VOLUMES: CPT

## 2022-05-19 ENCOUNTER — OFFICE VISIT (OUTPATIENT)
Dept: PULMONOLOGY | Age: 22
End: 2022-05-19
Payer: COMMERCIAL

## 2022-05-19 VITALS
TEMPERATURE: 97.8 F | OXYGEN SATURATION: 98 % | DIASTOLIC BLOOD PRESSURE: 72 MMHG | HEIGHT: 60 IN | WEIGHT: 134.6 LBS | HEART RATE: 98 BPM | BODY MASS INDEX: 26.42 KG/M2 | SYSTOLIC BLOOD PRESSURE: 128 MMHG

## 2022-05-19 DIAGNOSIS — J44.9 COPD, SEVERE (HCC): ICD-10-CM

## 2022-05-19 DIAGNOSIS — G47.33 OSA (OBSTRUCTIVE SLEEP APNEA): ICD-10-CM

## 2022-05-19 DIAGNOSIS — J39.8 TRACHEAL STENOSIS: Primary | ICD-10-CM

## 2022-05-19 PROCEDURE — 99213 OFFICE O/P EST LOW 20 MIN: CPT | Performed by: INTERNAL MEDICINE

## 2022-05-19 RX ORDER — FLUTICASONE PROPIONATE 110 UG/1
2 AEROSOL, METERED RESPIRATORY (INHALATION) 2 TIMES DAILY
Qty: 12 G | Refills: 5 | Status: SHIPPED | OUTPATIENT
Start: 2022-05-19

## 2022-05-19 RX ORDER — LEVALBUTEROL TARTRATE 45 UG/1
2 AEROSOL, METERED ORAL
Qty: 15 G | Refills: 5 | Status: SHIPPED | OUTPATIENT
Start: 2022-05-19
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
## 2022-05-19 NOTE — PROGRESS NOTES
Neck Circumference - 17.25    Mallampati - 2    Lung Nodule Screening     [] Qualifies    [x] Does not qualify   [] Declined    [] Completed

## 2022-05-27 ENCOUNTER — HOSPITAL ENCOUNTER (OUTPATIENT)
Age: 22
Discharge: HOME | End: 2022-05-27
Payer: COMMERCIAL

## 2022-05-27 ENCOUNTER — PATIENT MESSAGE (OUTPATIENT)
Dept: NEPHROLOGY | Age: 22
End: 2022-05-27

## 2022-05-27 DIAGNOSIS — I10 ESSENTIAL HYPERTENSION: ICD-10-CM

## 2022-05-27 LAB
BASOPHILS # BLD: 0.5 %
BASOPHILS ABSOLUTE: 0 THOU/MM3 (ref 0–0.1)
EOSINOPHIL # BLD: 1.4 %
EOSINOPHILS ABSOLUTE: 0.1 THOU/MM3 (ref 0–0.4)
ERYTHROCYTE [DISTWIDTH] IN BLOOD BY AUTOMATED COUNT: 12.6 % (ref 11.5–14.5)
ERYTHROCYTE [DISTWIDTH] IN BLOOD BY AUTOMATED COUNT: 42.9 FL (ref 35–45)
HCT VFR BLD CALC: 47.2 % (ref 42–52)
HEMOGLOBIN: 15.3 GM/DL (ref 14–18)
IMMATURE GRANS (ABS): 0.03 THOU/MM3 (ref 0–0.07)
IMMATURE GRANULOCYTES: 0.4 %
LYMPHOCYTES # BLD: 30.1 %
LYMPHOCYTES ABSOLUTE: 2.5 THOU/MM3 (ref 1–4.8)
MCH RBC QN AUTO: 29.9 PG (ref 26–33)
MCHC RBC AUTO-ENTMCNC: 32.4 GM/DL (ref 32.2–35.5)
MCV RBC AUTO: 92.4 FL (ref 80–94)
MONOCYTES # BLD: 10.3 %
MONOCYTES ABSOLUTE: 0.9 THOU/MM3 (ref 0.4–1.3)
NUCLEATED RED BLOOD CELLS: 0 /100 WBC
PLATELET # BLD: 266 THOU/MM3 (ref 130–400)
PMV BLD AUTO: 10.2 FL (ref 9.4–12.4)
RBC # BLD: 5.11 MILL/MM3 (ref 4.7–6.1)
SEG NEUTROPHILS: 57.3 %
SEGMENTED NEUTROPHILS ABSOLUTE COUNT: 4.8 THOU/MM3 (ref 1.8–7.7)
WBC # BLD: 8.4 THOU/MM3 (ref 4.8–10.8)

## 2022-05-27 PROCEDURE — 85025 COMPLETE CBC W/AUTO DIFF WBC: CPT

## 2022-05-27 PROCEDURE — 80053 COMPREHEN METABOLIC PANEL: CPT

## 2022-05-27 NOTE — TELEPHONE ENCOUNTER
From: Ngozi Acharya  To: Dr. Loreta Diaz: 5/27/2022 5:46 AM EDT  Subject: Blood pressure elevated    Good morning I was taking BP once a week but on Tuesday I took it was 158/88, Wed. 154/86, Thursday 168/90 and this morning it was 158/88. I think we need to start him back on a low dose of something. Please advise. He has not been on any medicine for BP since September.

## 2022-05-28 LAB
ALBUMIN SERPL-MCNC: 4.8 G/DL (ref 3.5–5.1)
ALP BLD-CCNC: 123 U/L (ref 38–126)
ALT SERPL-CCNC: 25 U/L (ref 11–66)
ANION GAP SERPL CALCULATED.3IONS-SCNC: 10 MEQ/L (ref 8–16)
AST SERPL-CCNC: 25 U/L (ref 5–40)
BILIRUB SERPL-MCNC: < 0.2 MG/DL (ref 0.3–1.2)
BUN BLDV-MCNC: 21 MG/DL (ref 7–22)
CALCIUM SERPL-MCNC: 9.6 MG/DL (ref 8.5–10.5)
CHLORIDE BLD-SCNC: 105 MEQ/L (ref 98–111)
CO2: 26 MEQ/L (ref 23–33)
CREAT SERPL-MCNC: 0.6 MG/DL (ref 0.4–1.2)
GFR SERPL CREATININE-BSD FRML MDRD: > 90 ML/MIN/1.73M2
GLUCOSE BLD-MCNC: 103 MG/DL (ref 70–108)
POTASSIUM SERPL-SCNC: 3.5 MEQ/L (ref 3.5–5.2)
SODIUM BLD-SCNC: 141 MEQ/L (ref 135–145)
TOTAL PROTEIN: 7.5 G/DL (ref 6.1–8)

## 2022-05-31 RX ORDER — AMLODIPINE BESYLATE 5 MG/1
5 TABLET ORAL DAILY
Qty: 30 TABLET | Refills: 3 | Status: SHIPPED | OUTPATIENT
Start: 2022-05-31

## 2022-06-18 ENCOUNTER — HOSPITAL ENCOUNTER (OUTPATIENT)
Age: 22
Discharge: HOME | End: 2022-06-18
Payer: COMMERCIAL

## 2022-06-18 LAB
AVERAGE GLUCOSE: 90 MG/DL (ref 70–126)
HBA1C MFR BLD: 5 % (ref 4.4–6.4)

## 2022-06-18 PROCEDURE — 36415 COLL VENOUS BLD VENIPUNCTURE: CPT

## 2022-06-18 PROCEDURE — 83036 HEMOGLOBIN GLYCOSYLATED A1C: CPT

## 2022-06-20 ENCOUNTER — HOSPITAL ENCOUNTER (OUTPATIENT)
Age: 22
Discharge: HOME | End: 2022-06-20
Payer: COMMERCIAL

## 2022-06-20 LAB
CHOLESTEROL, TOTAL: 177 MG/DL (ref 100–199)
HDLC SERPL-MCNC: 30 MG/DL
LDL CHOLESTEROL CALCULATED: 134 MG/DL
TRIGL SERPL-MCNC: 65 MG/DL (ref 0–199)

## 2022-06-20 PROCEDURE — 80061 LIPID PANEL: CPT

## 2022-06-20 PROCEDURE — 36415 COLL VENOUS BLD VENIPUNCTURE: CPT

## 2022-09-16 ENCOUNTER — OFFICE VISIT (OUTPATIENT)
Dept: FAMILY MEDICINE CLINIC | Age: 22
End: 2022-09-16
Payer: COMMERCIAL

## 2022-09-16 VITALS
RESPIRATION RATE: 20 BRPM | SYSTOLIC BLOOD PRESSURE: 108 MMHG | BODY MASS INDEX: 25.52 KG/M2 | HEIGHT: 60 IN | WEIGHT: 130 LBS | TEMPERATURE: 98 F | OXYGEN SATURATION: 98 % | HEART RATE: 89 BPM | DIASTOLIC BLOOD PRESSURE: 74 MMHG

## 2022-09-16 DIAGNOSIS — Z00.00 ROUTINE PHYSICAL EXAMINATION: Primary | ICD-10-CM

## 2022-09-16 DIAGNOSIS — G80.9 CEREBRAL PALSY, UNSPECIFIED TYPE (HCC): ICD-10-CM

## 2022-09-16 DIAGNOSIS — I10 ESSENTIAL HYPERTENSION: ICD-10-CM

## 2022-09-16 PROCEDURE — 99395 PREV VISIT EST AGE 18-39: CPT | Performed by: NURSE PRACTITIONER

## 2022-09-16 ASSESSMENT — ENCOUNTER SYMPTOMS
GASTROINTESTINAL NEGATIVE: 1
RESPIRATORY NEGATIVE: 1
EYES NEGATIVE: 1

## 2022-09-16 ASSESSMENT — PATIENT HEALTH QUESTIONNAIRE - PHQ9
SUM OF ALL RESPONSES TO PHQ QUESTIONS 1-9: 0
2. FEELING DOWN, DEPRESSED OR HOPELESS: 0
1. LITTLE INTEREST OR PLEASURE IN DOING THINGS: 0
SUM OF ALL RESPONSES TO PHQ9 QUESTIONS 1 & 2: 0
SUM OF ALL RESPONSES TO PHQ QUESTIONS 1-9: 0
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
## 2022-11-17 ENCOUNTER — OFFICE VISIT (OUTPATIENT)
Dept: PULMONOLOGY | Age: 22
End: 2022-11-17
Payer: COMMERCIAL

## 2022-11-17 VITALS
WEIGHT: 134 LBS | HEART RATE: 76 BPM | HEIGHT: 60 IN | SYSTOLIC BLOOD PRESSURE: 112 MMHG | TEMPERATURE: 97.9 F | DIASTOLIC BLOOD PRESSURE: 66 MMHG | BODY MASS INDEX: 26.31 KG/M2 | OXYGEN SATURATION: 100 %

## 2022-11-17 DIAGNOSIS — J44.9 CHRONIC OBSTRUCTIVE PULMONARY DISEASE, UNSPECIFIED COPD TYPE (HCC): ICD-10-CM

## 2022-11-17 DIAGNOSIS — J39.8 TRACHEAL STENOSIS: Primary | ICD-10-CM

## 2022-11-17 PROCEDURE — 99213 OFFICE O/P EST LOW 20 MIN: CPT | Performed by: INTERNAL MEDICINE

## 2022-11-17 RX ORDER — FLUTICASONE PROPIONATE 110 UG/1
2 AEROSOL, METERED RESPIRATORY (INHALATION) 2 TIMES DAILY
Qty: 12 G | Refills: 5 | Status: SHIPPED | OUTPATIENT
Start: 2022-11-17

## 2022-11-17 RX ORDER — LEVALBUTEROL TARTRATE 45 UG/1
2 AEROSOL, METERED ORAL
Qty: 15 G | Refills: 5 | Status: SHIPPED | OUTPATIENT
Start: 2022-11-17
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
## 2022-11-17 NOTE — PROGRESS NOTES
Neck Circumference -   15  Mallampati - 3    Lung Nodule Screening     [] Qualifies    [x] Does not qualify   [] Declined    [] Completed

## 2023-03-17 DIAGNOSIS — J44.9 COPD, SEVERE (HCC): Primary | ICD-10-CM

## 2023-03-20 RX ORDER — DEXAMETHASONE 4 MG/1
TABLET ORAL
Qty: 12 G | Refills: 5 | Status: SHIPPED | OUTPATIENT
Start: 2023-03-20 | End: 2023-05-10 | Stop reason: SDUPTHER

## 2023-03-24 DIAGNOSIS — J44.9 COPD, SEVERE (HCC): ICD-10-CM

## 2023-03-28 RX ORDER — DEXAMETHASONE 4 MG/1
TABLET ORAL
Qty: 12 G | Refills: 5 | OUTPATIENT
Start: 2023-03-28

## 2023-04-03 ENCOUNTER — HOSPITAL ENCOUNTER (OUTPATIENT)
Age: 23
Discharge: HOME | End: 2023-04-03
Payer: COMMERCIAL

## 2023-04-03 DIAGNOSIS — N20.0 NEPHROLITHIASIS: ICD-10-CM

## 2023-04-03 DIAGNOSIS — I10 ESSENTIAL HYPERTENSION: ICD-10-CM

## 2023-04-03 LAB
AMORPH SED URNS QL MICRO: NORMAL
ANION GAP SERPL CALC-SCNC: 14 MEQ/L (ref 8–16)
BACTERIA URNS QL MICRO: NORMAL
BILIRUB UR QL STRIP.AUTO: NEGATIVE
BUN SERPL-MCNC: 21 MG/DL (ref 7–22)
CALCIUM SERPL-MCNC: 9.4 MG/DL (ref 8.5–10.5)
CASTS #/AREA URNS LPF: NORMAL /LPF
CHARACTER UR: CLEAR
CHLORIDE SERPL-SCNC: 103 MEQ/L (ref 98–111)
CO2 SERPL-SCNC: 24 MEQ/L (ref 23–33)
COLOR UR AUTO: YELLOW
CREAT SERPL-MCNC: 0.6 MG/DL (ref 0.4–1.2)
CRYSTALS VITF MICRO: NORMAL
EPI CELLS #/AREA URNS HPF: NORMAL /HPF
GFR SERPL CREATININE-BSD FRML MDRD: > 60 ML/MIN/1.73M2
GLUCOSE SERPL-MCNC: 106 MG/DL (ref 70–108)
GLUCOSE UR QL STRIP.AUTO: NEGATIVE MG/DL
HGB UR STRIP.AUTO-MCNC: NEGATIVE MG/L
KETONES UR QL STRIP.AUTO: NEGATIVE
LEUKOCYTE ESTERASE UR QL STRIP.AUTO: NEGATIVE
MUCOUS THREADS URNS QL MICRO: NORMAL
NITRITE UR QL STRIP.AUTO: NEGATIVE
PH UR STRIP.AUTO: 6 [PH] (ref 5–9)
POTASSIUM SERPL-SCNC: 3.9 MEQ/L (ref 3.5–5.2)
PROT UR STRIP.AUTO-MCNC: NEGATIVE MG/DL
RBC #/AREA URNS HPF: NORMAL /HPF
SODIUM SERPL-SCNC: 141 MEQ/L (ref 135–145)
SP GR UR STRIP.AUTO: 1.02 (ref 1–1.03)
UROBILINOGEN UR STRIP-ACNC: 0.2 EU/DL (ref 0.2–1)
WBC # UR STRIP.AUTO: NORMAL /HPF

## 2023-04-03 PROCEDURE — 82570 ASSAY OF URINE CREATININE: CPT

## 2023-04-03 PROCEDURE — 81001 URINALYSIS AUTO W/SCOPE: CPT

## 2023-04-03 PROCEDURE — 36415 COLL VENOUS BLD VENIPUNCTURE: CPT

## 2023-04-03 PROCEDURE — 84156 ASSAY OF PROTEIN URINE: CPT

## 2023-04-03 PROCEDURE — 82043 UR ALBUMIN QUANTITATIVE: CPT

## 2023-04-03 PROCEDURE — 80048 BASIC METABOLIC PNL TOTAL CA: CPT

## 2023-04-04 LAB
CREAT UR-MCNC: 129.3 MG/DL
CREAT UR-MCNC: 129.3 MG/DL
MICROALBUMIN UR-MCNC: < 1.2 MG/DL
MICROALBUMIN/CREAT RATIO PNL UR: 9 MG/G (ref 0–30)
PROT UR-MCNC: 11.1 MG/DL
PROT/CREAT 24H UR: 0.09 MG/G{CREAT}

## 2023-05-04 ENCOUNTER — HOSPITAL ENCOUNTER (OUTPATIENT)
Age: 23
Discharge: HOME | End: 2023-05-04
Payer: COMMERCIAL

## 2023-05-04 ENCOUNTER — HOSPITAL ENCOUNTER (OUTPATIENT)
Dept: GENERAL RADIOLOGY | Age: 23
Discharge: HOME | End: 2023-05-04
Payer: COMMERCIAL

## 2023-05-04 DIAGNOSIS — Q65.89 CONGENITAL HIP DYSPLASIA: ICD-10-CM

## 2023-05-04 PROCEDURE — 73522 X-RAY EXAM HIPS BI 3-4 VIEWS: CPT

## 2023-05-10 ENCOUNTER — OFFICE VISIT (OUTPATIENT)
Dept: PULMONOLOGY | Age: 23
End: 2023-05-10
Payer: COMMERCIAL

## 2023-05-10 VITALS
DIASTOLIC BLOOD PRESSURE: 88 MMHG | BODY MASS INDEX: 27.01 KG/M2 | TEMPERATURE: 97.5 F | SYSTOLIC BLOOD PRESSURE: 126 MMHG | HEART RATE: 93 BPM | HEIGHT: 60 IN | OXYGEN SATURATION: 96 % | WEIGHT: 137.6 LBS

## 2023-05-10 DIAGNOSIS — J44.9 COPD, SEVERE (HCC): ICD-10-CM

## 2023-05-10 DIAGNOSIS — Q32.0 TRACHEOMALACIA, CONGENITAL: ICD-10-CM

## 2023-05-10 DIAGNOSIS — G47.33 OSA (OBSTRUCTIVE SLEEP APNEA): Primary | ICD-10-CM

## 2023-05-10 PROCEDURE — 99214 OFFICE O/P EST MOD 30 MIN: CPT | Performed by: INTERNAL MEDICINE

## 2023-05-10 RX ORDER — FLUTICASONE PROPIONATE 110 UG/1
2 AEROSOL, METERED RESPIRATORY (INHALATION) 2 TIMES DAILY
Qty: 12 G | Refills: 5 | Status: SHIPPED | OUTPATIENT
Start: 2023-05-10

## 2023-05-10 RX ORDER — LEVALBUTEROL TARTRATE 45 UG/1
2 AEROSOL, METERED ORAL
Qty: 15 G | Refills: 5 | Status: SHIPPED | OUTPATIENT
Start: 2023-05-10

## 2023-05-10 NOTE — PROGRESS NOTES
Subjective:      Patient ID: Trinity Resendiz is a 25 y.o. male. CC: CLD    HPI     No new issues, comes with Mom  Jorden Montes is 25 and having some expecting behavioral issues like not exercising, using inhalers etc. Recent botox injections in the leg adductors, spending most of his free time on his electronics, not physically active, no new respiratory issues and stable chronic issues: cough, wheezing, stridor, snoring  Snores but otherwise no changes in his airway status  On Flovent and Xopenex. Recent evaluation at the renal clinic noted, remains stable        Previous  Jorden Montes remembers my name and is happy to see me, comes to review PFTs  No changes in condition  Started on Risperidone and sleeping much better. No new respiratory issues, no difficulties eating, no cough, snores at night and has to sleep semi reclined--Had a sleep study in [facility] revealing mild PILAR  [place] [employer]  On Flovent and rare need for Xopenex. PFTs reveal a severe obstructive impairment. His pulmonary care has been delivered by Dr Cathie Elizalde and other associates at the [facility], last visit 9/21/21, visit interval usually every 6 mos, last hospital admit [facility] 9/24/21-- for dehydration. Condition stable, no needs today, review of extensive records in care everyehwere done. Jorden Montes comes with his mother who is a RN who works for cardiac rehab, good historian. 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     Rogelio(9/21/21): %, FEV1: 56%, Ratio: 54%      ECHO:  FINDINGS:   ------------------------------------   Study Quality: Technically difficult echo due to poor

## 2023-05-10 NOTE — PROGRESS NOTES
Neck Circumference -   15.5  Mallampati - 3    Lung Nodule Screening     [] Qualifies    [x] Does not qualify   [] Declined    [] Completed

## 2023-06-20 RX ORDER — LINACLOTIDE 145 UG/1
145 CAPSULE, GELATIN COATED ORAL DAILY
COMMUNITY
Start: 2023-03-17

## 2023-06-20 NOTE — TELEPHONE ENCOUNTER
Patient's mom called asking for a refill on linzess 14mcg. Shannon Estes had previously prescribed medication but has since retired. Patient will be out of medication this week.

## 2023-08-26 ENCOUNTER — HOSPITAL ENCOUNTER (OUTPATIENT)
Age: 23
Discharge: HOME | End: 2023-08-26
Payer: COMMERCIAL

## 2023-08-26 LAB
ALBUMIN SERPL BCG-MCNC: 4.6 G/DL (ref 3.5–5.1)
ALP SERPL-CCNC: 101 U/L (ref 38–126)
ALT SERPL W/O P-5'-P-CCNC: 27 U/L (ref 11–66)
ANION GAP SERPL CALC-SCNC: 9 MEQ/L (ref 8–16)
AST SERPL-CCNC: 18 U/L (ref 5–40)
BILIRUB SERPL-MCNC: 0.4 MG/DL (ref 0.3–1.2)
BUN SERPL-MCNC: 13 MG/DL (ref 7–22)
CALCIUM SERPL-MCNC: 9.9 MG/DL (ref 8.5–10.5)
CHLORIDE SERPL-SCNC: 104 MEQ/L (ref 98–111)
CHOLEST SERPL-MCNC: 173 MG/DL (ref 100–199)
CO2 SERPL-SCNC: 29 MEQ/L (ref 23–33)
CREAT SERPL-MCNC: 0.6 MG/DL (ref 0.4–1.2)
DEPRECATED MEAN GLUCOSE BLD GHB EST-ACNC: 99 MG/DL (ref 70–126)
DEPRECATED RDW RBC AUTO: 44 FL (ref 35–45)
ERYTHROCYTE [DISTWIDTH] IN BLOOD BY AUTOMATED COUNT: 12.8 % (ref 11.5–14.5)
GFR SERPL CREATININE-BSD FRML MDRD: > 60 ML/MIN/1.73M2
GLUCOSE SERPL-MCNC: 85 MG/DL (ref 70–108)
HBA1C MFR BLD HPLC: 5.3 % (ref 4.4–6.4)
HCT VFR BLD AUTO: 50.4 % (ref 42–52)
HDLC SERPL-MCNC: 30 MG/DL
HGB BLD-MCNC: 16.1 GM/DL (ref 14–18)
LDLC SERPL CALC-MCNC: 129 MG/DL
MCH RBC QN AUTO: 30.2 PG (ref 26–33)
MCHC RBC AUTO-ENTMCNC: 31.9 GM/DL (ref 32.2–35.5)
MCV RBC AUTO: 94.6 FL (ref 80–94)
PLATELET # BLD AUTO: 248 THOU/MM3 (ref 130–400)
PMV BLD AUTO: 10.6 FL (ref 9.4–12.4)
POTASSIUM SERPL-SCNC: 4.5 MEQ/L (ref 3.5–5.2)
PROT SERPL-MCNC: 7.8 G/DL (ref 6.1–8)
RBC # BLD AUTO: 5.33 MILL/MM3 (ref 4.7–6.1)
SODIUM SERPL-SCNC: 142 MEQ/L (ref 135–145)
TRIGL SERPL-MCNC: 71 MG/DL (ref 0–199)
WBC # BLD AUTO: 6.2 THOU/MM3 (ref 4.8–10.8)

## 2023-08-26 PROCEDURE — 80061 LIPID PANEL: CPT

## 2023-08-26 PROCEDURE — 83036 HEMOGLOBIN GLYCOSYLATED A1C: CPT

## 2023-08-26 PROCEDURE — 36415 COLL VENOUS BLD VENIPUNCTURE: CPT

## 2023-08-26 PROCEDURE — 85027 COMPLETE CBC AUTOMATED: CPT

## 2023-08-26 PROCEDURE — 80053 COMPREHEN METABOLIC PANEL: CPT

## 2023-12-12 ENCOUNTER — OFFICE VISIT (OUTPATIENT)
Dept: FAMILY MEDICINE CLINIC | Age: 23
End: 2023-12-12
Payer: COMMERCIAL

## 2023-12-12 VITALS
WEIGHT: 138 LBS | HEIGHT: 60 IN | OXYGEN SATURATION: 97 % | BODY MASS INDEX: 27.09 KG/M2 | RESPIRATION RATE: 18 BRPM | HEART RATE: 80 BPM | DIASTOLIC BLOOD PRESSURE: 86 MMHG | SYSTOLIC BLOOD PRESSURE: 130 MMHG | TEMPERATURE: 98.6 F

## 2023-12-12 DIAGNOSIS — Z00.00 ROUTINE PHYSICAL EXAMINATION: Primary | ICD-10-CM

## 2023-12-12 DIAGNOSIS — J44.9 OBSTRUCTIVE LUNG DISEASE (GENERALIZED) (HCC): ICD-10-CM

## 2023-12-12 DIAGNOSIS — G80.9 CEREBRAL PALSY, UNSPECIFIED TYPE (HCC): ICD-10-CM

## 2023-12-12 DIAGNOSIS — I10 ESSENTIAL HYPERTENSION: ICD-10-CM

## 2023-12-12 PROCEDURE — 3075F SYST BP GE 130 - 139MM HG: CPT | Performed by: NURSE PRACTITIONER

## 2023-12-12 PROCEDURE — 90471 IMMUNIZATION ADMIN: CPT | Performed by: NURSE PRACTITIONER

## 2023-12-12 PROCEDURE — 90677 PCV20 VACCINE IM: CPT | Performed by: NURSE PRACTITIONER

## 2023-12-12 PROCEDURE — 99395 PREV VISIT EST AGE 18-39: CPT | Performed by: NURSE PRACTITIONER

## 2023-12-12 PROCEDURE — 3079F DIAST BP 80-89 MM HG: CPT | Performed by: NURSE PRACTITIONER

## 2023-12-12 SDOH — ECONOMIC STABILITY: FOOD INSECURITY: WITHIN THE PAST 12 MONTHS, YOU WORRIED THAT YOUR FOOD WOULD RUN OUT BEFORE YOU GOT MONEY TO BUY MORE.: NEVER TRUE

## 2023-12-12 SDOH — ECONOMIC STABILITY: INCOME INSECURITY: HOW HARD IS IT FOR YOU TO PAY FOR THE VERY BASICS LIKE FOOD, HOUSING, MEDICAL CARE, AND HEATING?: NOT HARD AT ALL

## 2023-12-12 SDOH — ECONOMIC STABILITY: FOOD INSECURITY: WITHIN THE PAST 12 MONTHS, THE FOOD YOU BOUGHT JUST DIDN'T LAST AND YOU DIDN'T HAVE MONEY TO GET MORE.: NEVER TRUE

## 2023-12-12 SDOH — ECONOMIC STABILITY: HOUSING INSECURITY
IN THE LAST 12 MONTHS, WAS THERE A TIME WHEN YOU DID NOT HAVE A STEADY PLACE TO SLEEP OR SLEPT IN A SHELTER (INCLUDING NOW)?: NO

## 2023-12-12 ASSESSMENT — PATIENT HEALTH QUESTIONNAIRE - PHQ9: DEPRESSION UNABLE TO ASSESS: FUNCTIONAL CAPACITY MOTIVATION LIMITS ACCURACY

## 2023-12-12 ASSESSMENT — ENCOUNTER SYMPTOMS
RESPIRATORY NEGATIVE: 1
GASTROINTESTINAL NEGATIVE: 1
EYES NEGATIVE: 1
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
## 2024-01-17 ENCOUNTER — TELEPHONE (OUTPATIENT)
Dept: PULMONOLOGY | Age: 24
End: 2024-01-17

## 2024-01-17 NOTE — TELEPHONE ENCOUNTER
We received a fax from [facility] stating that they will not cover this pts Flovent. They will however cover Anoro and Advair. Please advise, than you.

## 2024-01-18 RX ORDER — FLUTICASONE PROPIONATE AND SALMETEROL XINAFOATE 115; 21 UG/1; UG/1
2 AEROSOL, METERED RESPIRATORY (INHALATION) 2 TIMES DAILY
Qty: 1 EACH | Refills: 5 | Status: SHIPPED | OUTPATIENT
Start: 2024-01-18 | End: 2025-01-17
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
## 2024-01-24 ENCOUNTER — OFFICE VISIT (OUTPATIENT)
Dept: PULMONOLOGY | Age: 24
End: 2024-01-24
Payer: COMMERCIAL

## 2024-01-24 VITALS
OXYGEN SATURATION: 97 % | BODY MASS INDEX: 27.21 KG/M2 | WEIGHT: 138.6 LBS | HEIGHT: 60 IN | TEMPERATURE: 98.1 F | SYSTOLIC BLOOD PRESSURE: 126 MMHG | DIASTOLIC BLOOD PRESSURE: 76 MMHG | HEART RATE: 94 BPM

## 2024-01-24 DIAGNOSIS — J44.9 OBSTRUCTIVE LUNG DISEASE (GENERALIZED) (HCC): ICD-10-CM

## 2024-01-24 DIAGNOSIS — G47.33 OSA (OBSTRUCTIVE SLEEP APNEA): ICD-10-CM

## 2024-01-24 DIAGNOSIS — Q32.0 TRACHEOMALACIA, CONGENITAL: Primary | ICD-10-CM

## 2024-01-24 DIAGNOSIS — J44.9 CHRONIC OBSTRUCTIVE PULMONARY DISEASE, UNSPECIFIED COPD TYPE (HCC): ICD-10-CM

## 2024-01-24 PROCEDURE — 99213 OFFICE O/P EST LOW 20 MIN: CPT | Performed by: INTERNAL MEDICINE

## 2024-01-24 RX ORDER — LEVALBUTEROL TARTRATE 45 UG/1
2 AEROSOL, METERED ORAL
Qty: 15 G | Refills: 5 | Status: SHIPPED | OUTPATIENT
Start: 2024-01-24

## 2024-01-30 ENCOUNTER — TELEPHONE (OUTPATIENT)
Dept: PULMONOLOGY | Age: 24
End: 2024-01-30

## 2024-01-31 ENCOUNTER — TELEPHONE (OUTPATIENT)
Dept: PULMONOLOGY | Age: 24
End: 2024-01-31

## 2024-01-31 NOTE — TELEPHONE ENCOUNTER
Authorization Dept is calling and states the Fluticasone Propionate HSA has been denied and they state the office will be receiving correspondense on this with case number [id number]

## 2024-04-03 ENCOUNTER — HOSPITAL ENCOUNTER (OUTPATIENT)
Age: 24
Discharge: HOME | End: 2024-04-03
Payer: COMMERCIAL

## 2024-04-03 DIAGNOSIS — I10 ESSENTIAL HYPERTENSION: ICD-10-CM

## 2024-04-03 DIAGNOSIS — N20.0 NEPHROLITHIASIS: ICD-10-CM

## 2024-04-03 LAB
ALBUMIN SERPL BCG-MCNC: 4.9 G/DL (ref 3.5–5.1)
ALP SERPL-CCNC: 111 U/L (ref 38–126)
ALT SERPL W/O P-5'-P-CCNC: 35 U/L (ref 11–66)
ANION GAP SERPL CALC-SCNC: 15 MEQ/L (ref 8–16)
AST SERPL-CCNC: 27 U/L (ref 5–40)
BACTERIA: NORMAL
BILIRUB SERPL-MCNC: 0.2 MG/DL (ref 0.3–1.2)
BILIRUB UR QL STRIP: NEGATIVE
BUN SERPL-MCNC: 12 MG/DL (ref 7–22)
CALCIUM SERPL-MCNC: 9.6 MG/DL (ref 8.5–10.5)
CASTS #/AREA URNS LPF: NORMAL /LPF
CASTS #/AREA URNS LPF: NORMAL /LPF
CHARACTER UR: CLEAR
CHARCOAL URNS QL MICRO: NORMAL
CHLORIDE SERPL-SCNC: 100 MEQ/L (ref 98–111)
CO2 SERPL-SCNC: 25 MEQ/L (ref 23–33)
COLOR UR: YELLOW
CREAT SERPL-MCNC: 0.6 MG/DL (ref 0.4–1.2)
CREAT UR-MCNC: 53.6 MG/DL
CRYSTALS URNS QL MICRO: NORMAL
EPITHELIAL CELLS, UA: NORMAL /HPF
GFR SERPL CREATININE-BSD FRML MDRD: > 90 ML/MIN/1.73M2
GLUCOSE SERPL-MCNC: 93 MG/DL (ref 70–108)
GLUCOSE UR QL STRIP.AUTO: NEGATIVE MG/DL
HGB UR QL STRIP.AUTO: NEGATIVE
KETONES UR QL STRIP.AUTO: NEGATIVE
LEUKOCYTE ESTERASE UR QL STRIP.AUTO: NEGATIVE
NITRITE UR QL STRIP.AUTO: NEGATIVE
PH UR STRIP.AUTO: 7.5 [PH] (ref 5–9)
POTASSIUM SERPL-SCNC: 4 MEQ/L (ref 3.5–5.2)
PROT SERPL-MCNC: 7.8 G/DL (ref 6.1–8)
PROT UR STRIP.AUTO-MCNC: NEGATIVE MG/DL
PROT UR-MCNC: 4.9 MG/DL
PROT/CREAT 24H UR: 0.09 MG/G{CREAT}
RBC #/AREA URNS HPF: NORMAL /HPF
RENAL EPI CELLS #/AREA URNS HPF: NORMAL /[HPF]
SODIUM SERPL-SCNC: 140 MEQ/L (ref 135–145)
SPECIFIC GRAVITY UA: 1.01 (ref 1–1.03)
UROBILINOGEN, URINE: 0.2 EU/DL (ref 0–1)
WBC #/AREA URNS HPF: NORMAL /HPF
YEAST LIKE FUNGI URNS QL MICRO: NORMAL

## 2024-04-03 PROCEDURE — 81001 URINALYSIS AUTO W/SCOPE: CPT

## 2024-04-03 PROCEDURE — 36415 COLL VENOUS BLD VENIPUNCTURE: CPT

## 2024-04-03 PROCEDURE — 82043 UR ALBUMIN QUANTITATIVE: CPT

## 2024-04-03 PROCEDURE — 84156 ASSAY OF PROTEIN URINE: CPT

## 2024-04-03 PROCEDURE — 82570 ASSAY OF URINE CREATININE: CPT

## 2024-04-03 PROCEDURE — 80053 COMPREHEN METABOLIC PANEL: CPT

## 2024-04-04 LAB
CREAT UR-MCNC: 53.6 MG/DL
MICROALBUMIN UR-MCNC: < 1.2 MG/DL
MICROALBUMIN/CREAT RATIO PNL UR: 22 MG/G (ref 0–30)

## 2024-04-11 ENCOUNTER — OFFICE VISIT (OUTPATIENT)
Dept: NEPHROLOGY | Age: 24
End: 2024-04-11
Payer: COMMERCIAL

## 2024-04-11 VITALS
DIASTOLIC BLOOD PRESSURE: 82 MMHG | SYSTOLIC BLOOD PRESSURE: 138 MMHG | OXYGEN SATURATION: 95 % | BODY MASS INDEX: 26.37 KG/M2 | WEIGHT: 135 LBS | HEART RATE: 88 BPM

## 2024-04-11 DIAGNOSIS — N20.0 NEPHROLITHIASIS: Primary | ICD-10-CM

## 2024-04-11 DIAGNOSIS — I10 ESSENTIAL HYPERTENSION: ICD-10-CM

## 2024-04-11 PROCEDURE — 99213 OFFICE O/P EST LOW 20 MIN: CPT | Performed by: INTERNAL MEDICINE

## 2024-04-11 PROCEDURE — 3079F DIAST BP 80-89 MM HG: CPT | Performed by: INTERNAL MEDICINE

## 2024-04-11 PROCEDURE — 3075F SYST BP GE 130 - 139MM HG: CPT | Performed by: INTERNAL MEDICINE

## 2024-04-11 RX ORDER — AMLODIPINE BESYLATE 5 MG/1
5 TABLET ORAL DAILY
Qty: 30 TABLET | Refills: 0 | Status: SHIPPED | OUTPATIENT
Start: 2024-04-11

## 2024-04-11 NOTE — PROGRESS NOTES
fluticasone-salmeterol (ADVAIR HFA) 115-21 MCG/ACT inhaler Inhale 2 puffs into the lungs 2 times daily Rinse mouth after its use. 1 each 5    LINZESS 145 MCG capsule take 1 capsule by mouth every morning before breakfast 90 capsule 1    amLODIPine (NORVASC) 5 MG tablet Take 1 tablet by mouth daily (Patient taking differently: Take 1 tablet by mouth daily As needed for elevated BP) 30 tablet 3    risperiDONE (RISPERDAL) 2 MG tablet Take by mouth nightly       onabotulinumtoxin A (BOTOX) 100 units injection Inject 100 Units into the muscle Every 4 months (last dose July 2021)  Used for leg spasms      Famotidine (PEPCID PO) Take 10 mg by mouth daily       Multiple Vitamin (MULTI-VITAMIN DAILY PO) Take 1 tablet by mouth daily       NONFORMULARY Take 1 tablet by mouth 2 times daily Cerevive       baclofen (LIORESAL) 20 MG tablet Take 1 tablet by mouth 3 times daily      ACETAMINOPHEN PO Take 325 mg by mouth every 4 hours as needed          Vitals     /82 (Site: Left Upper Arm, Position: Sitting, Cuff Size: Medium Adult)   Pulse 88   Wt 61.2 kg (135 lb)   SpO2 95%   BMI 26.37 kg/m²  Wt Readings from Last 3 Encounters:   04/11/24 61.2 kg (135 lb)   01/24/24 62.9 kg (138 lb 9.6 oz)   12/12/23 62.6 kg (138 lb)        Physical Exam     General -- no distress  Lungs -- clear  Heart -- S1, S2 heard, JVD - no  Abdomen - soft, non-tender  Extremities -- no edema  CNS - awake and alert    Labs, Radiology and Tests    Labs -    9/21 3/22 4/23 4/24        Potassium 3.6 4.0 3.9 4.0        BUN 24 20 21 12        Creatinine 1.0 0.7 0.6 0.6        eGFR > 90 > 90 >60 >90                    UPCR  < 100 90 90        UMCR  8 9 22                      Renal Ultrasound Scan -- 9/21  Right kidney 9.8 cm left kidney 10.9 cm small cyst noted on the right kidney     Assessment    Renal -patient renal function is better recent recovery from acute kidney injury  -However his baseline is around 0.5-0.6  -Most likely this is his baseline .

## 2024-05-20 RX ORDER — AMLODIPINE BESYLATE 5 MG/1
TABLET ORAL
Qty: 30 TABLET | Refills: 5 | Status: SHIPPED | OUTPATIENT
Start: 2024-05-20

## 2024-06-17 RX ORDER — LINACLOTIDE 145 UG/1
145 CAPSULE, GELATIN COATED ORAL
Qty: 90 CAPSULE | Refills: 1 | Status: SHIPPED | OUTPATIENT
Start: 2024-06-17

## 2024-06-27 ENCOUNTER — HOSPITAL ENCOUNTER (OUTPATIENT)
Age: 24
Discharge: HOME | End: 2024-06-27
Payer: COMMERCIAL

## 2024-06-27 LAB
ALBUMIN SERPL BCG-MCNC: 4.6 G/DL (ref 3.5–5.1)
ALP SERPL-CCNC: 106 U/L (ref 38–126)
ALT SERPL W/O P-5'-P-CCNC: 30 U/L (ref 11–66)
ANION GAP SERPL CALC-SCNC: 17 MEQ/L (ref 8–16)
AST SERPL-CCNC: 29 U/L (ref 5–40)
BILIRUB SERPL-MCNC: 0.2 MG/DL (ref 0.3–1.2)
BUN SERPL-MCNC: 13 MG/DL (ref 7–22)
CALCIUM SERPL-MCNC: 10 MG/DL (ref 8.5–10.5)
CHLORIDE SERPL-SCNC: 100 MEQ/L (ref 98–111)
CHOLEST SERPL-MCNC: 169 MG/DL (ref 100–199)
CO2 SERPL-SCNC: 23 MEQ/L (ref 23–33)
CREAT SERPL-MCNC: 0.6 MG/DL (ref 0.4–1.2)
DEPRECATED RDW RBC AUTO: 42.1 FL (ref 35–45)
ERYTHROCYTE [DISTWIDTH] IN BLOOD BY AUTOMATED COUNT: 12.4 % (ref 11.5–14.5)
GFR SERPL CREATININE-BSD FRML MDRD: > 90 ML/MIN/1.73M2
GLUCOSE SERPL-MCNC: 87 MG/DL (ref 70–108)
HCT VFR BLD AUTO: 46.6 % (ref 42–52)
HDLC SERPL-MCNC: 31 MG/DL
HGB BLD-MCNC: 15.4 GM/DL (ref 14–18)
LDLC SERPL CALC-MCNC: 126 MG/DL
MCH RBC QN AUTO: 30.6 PG (ref 26–33)
MCHC RBC AUTO-ENTMCNC: 33 GM/DL (ref 32.2–35.5)
MCV RBC AUTO: 92.5 FL (ref 80–94)
PLATELET # BLD AUTO: 307 THOU/MM3 (ref 130–400)
PMV BLD AUTO: 10.8 FL (ref 9.4–12.4)
POTASSIUM SERPL-SCNC: 4.1 MEQ/L (ref 3.5–5.2)
PROT SERPL-MCNC: 7.9 G/DL (ref 6.1–8)
RBC # BLD AUTO: 5.04 MILL/MM3 (ref 4.7–6.1)
SODIUM SERPL-SCNC: 140 MEQ/L (ref 135–145)
TRIGL SERPL-MCNC: 61 MG/DL (ref 0–199)
WBC # BLD AUTO: 7.6 THOU/MM3 (ref 4.8–10.8)

## 2024-06-27 PROCEDURE — 80053 COMPREHEN METABOLIC PANEL: CPT

## 2024-06-27 PROCEDURE — 36415 COLL VENOUS BLD VENIPUNCTURE: CPT

## 2024-06-27 PROCEDURE — 83036 HEMOGLOBIN GLYCOSYLATED A1C: CPT

## 2024-06-27 PROCEDURE — 80061 LIPID PANEL: CPT

## 2024-06-27 PROCEDURE — 85027 COMPLETE CBC AUTOMATED: CPT

## 2024-06-28 LAB
DEPRECATED MEAN GLUCOSE BLD GHB EST-ACNC: 96 MG/DL (ref 70–126)
HBA1C MFR BLD HPLC: 5.2 % (ref 4.4–6.4)

## 2024-07-22 ENCOUNTER — TELEPHONE (OUTPATIENT)
Dept: PULMONOLOGY | Age: 24
End: 2024-07-22

## 2024-07-22 RX ORDER — FLUTICASONE PROPIONATE AND SALMETEROL XINAFOATE 115; 21 UG/1; UG/1
2 AEROSOL, METERED RESPIRATORY (INHALATION) 2 TIMES DAILY
Qty: 3 EACH | Refills: 0 | Status: SHIPPED | OUTPATIENT
Start: 2024-07-22 | End: 2024-10-20

## 2024-07-22 RX ORDER — FLUTICASONE PROPIONATE AND SALMETEROL XINAFOATE 115; 21 UG/1; UG/1
2 AEROSOL, METERED RESPIRATORY (INHALATION) 2 TIMES DAILY
Qty: 1 EACH | Refills: 5 | Status: CANCELLED | OUTPATIENT
Start: 2024-07-22 | End: 2025-07-22

## 2024-07-22 NOTE — TELEPHONE ENCOUNTER
Patient calling in regards to medication ADVAIRE.    Patient reports that they need a refill for Advair -21 mcg inh    Last office appointment 1/24/24    Patient is scheduled for follow-up in office on 7/24/24.    Last seen by Dr. Haroldo MARIE    Medication refill routed to established provider Dr. Haroldo MARIE     Patient requests 90 day supply.     Preferred pharmacy is [facility]   Fax number [phone]

## 2024-07-24 ENCOUNTER — OFFICE VISIT (OUTPATIENT)
Dept: PULMONOLOGY | Age: 24
End: 2024-07-24
Payer: COMMERCIAL

## 2024-07-24 VITALS
WEIGHT: 132.8 LBS | HEIGHT: 60 IN | DIASTOLIC BLOOD PRESSURE: 88 MMHG | TEMPERATURE: 98 F | OXYGEN SATURATION: 97 % | SYSTOLIC BLOOD PRESSURE: 140 MMHG | BODY MASS INDEX: 26.07 KG/M2 | HEART RATE: 100 BPM

## 2024-07-24 DIAGNOSIS — J44.9 STAGE 3 SEVERE COPD BY GOLD CLASSIFICATION (HCC): Primary | ICD-10-CM

## 2024-07-24 DIAGNOSIS — J38.6 SUBGLOTTIC STENOSIS: ICD-10-CM

## 2024-07-24 DIAGNOSIS — J45.50 SEVERE PERSISTENT ASTHMA WITHOUT COMPLICATION: ICD-10-CM

## 2024-07-24 DIAGNOSIS — Q32.1 CONGENITAL STENOSIS OF TRACHEA: ICD-10-CM

## 2024-07-24 DIAGNOSIS — G80.9 CEREBRAL PALSY, UNSPECIFIED TYPE (HCC): ICD-10-CM

## 2024-07-24 PROCEDURE — 99214 OFFICE O/P EST MOD 30 MIN: CPT | Performed by: INTERNAL MEDICINE

## 2024-07-24 RX ORDER — PAROXETINE 10 MG/1
TABLET, FILM COATED ORAL
COMMUNITY
Start: 2024-07-15

## 2024-07-24 NOTE — PROGRESS NOTES
Neck Circumference -   16.5  Mallampati - 2    Lung Nodule Screening     [] Qualifies    [x] Does not qualify   [] Declined    [] Completed  
age 3 mos, airway reconstruction, T&A, obstructive PFTs, PILAR, exercise related stridor, wheezing controlled with ICS (Flovent) and Xopenex.    PFTs:          ECHO:  FINDINGS:   ------------------------------------   Study Quality: Technically difficult echo due to poor             echocardiographic  windows. M-mode measurements are normal.   SVn:      There is normal systemic venous return to the right atrium.   PVn:      The pulmonary veins are not adequately visualized.   RA:       The right atrial size is normal. No abnormalities are             visualized  in the right atrium.   LA:       The left atrial size is normal. No abnormalities are             visualized  in the left atrium.   IAS:      The atrial septum is inadequately visualized.   TV:       The tricuspid valve is normal without prolapse or             significant  insufficiency by Doppler interrogation.   MV:       The mitral valve is normal without prolapse or significant             insufficiency  by Doppler interrogation.   RV:       There is a normal-sized right ventricle without hypertrophy,             and  ventricular systolic performance is normal.   LV:       There is a normal-sized left ventricle without hypertrophy,             and  ventricular systolic performance is normal.   IVS:      The ventricular septum is normally rounded and intact.   PV:       The pulmonary valve is normal in motion and appearance and             the  Doppler flow velocity across the valve is normal.   AV:       The aortic valve is normal in motion and appearance and the             Doppler  flow velocity across the valve is normal.   PA:       The main pulmonary artery is normal.  The branch pulmonary             arteries  are confluent and normal-sized. The right             pulmonary  artery is normal in size The left pulmonary             artery  is normal in size   AO:       There is a normal left-sided aortic arch without evidence of

## 2024-10-21 RX ORDER — FLUTICASONE PROPIONATE AND SALMETEROL XINAFOATE 115; 21 UG/1; UG/1
2 AEROSOL, METERED RESPIRATORY (INHALATION) 2 TIMES DAILY
Qty: 3 EACH | Refills: 0 | Status: SHIPPED | OUTPATIENT
Start: 2024-10-21 | End: 2025-01-19

## 2024-10-21 NOTE — TELEPHONE ENCOUNTER
Received refill request for Spiriva. Medication was last ordered by Haroldo. Medication was last ordered on 7/24/24 with 1 refills.     Received refill request for Advair. Medication was last ordered by Haroldo. Medication was last ordered on 7/22/24 with 0 refills.   Patient was last seen in the office 7/24/24. Patient has a scheduled follow up 1/31/235.   Medication needs to be sent to [facility].

## 2024-10-28 ENCOUNTER — TELEPHONE (OUTPATIENT)
Dept: PULMONOLOGY | Age: 24
End: 2024-10-28

## 2024-10-28 NOTE — TELEPHONE ENCOUNTER
Patients mother called in stating that over the weekend she was diagnosed with bronchitis and she believes her son has gotten it as well but she cannot get him to go to the Dr... Sh stated that he has a dry cough, no fever, and a runny nose, She stated that Og has a nebulizer at home from his old Pulmonologist and his mother wanted to know if they could start him on it again for his symptoms as that is what the ER told her to do for herself. Please advise thank you

## 2024-10-29 ENCOUNTER — OFFICE VISIT (OUTPATIENT)
Dept: FAMILY MEDICINE CLINIC | Age: 24
End: 2024-10-29
Payer: COMMERCIAL

## 2024-10-29 VITALS
HEART RATE: 104 BPM | HEIGHT: 60 IN | TEMPERATURE: 98.6 F | OXYGEN SATURATION: 97 % | WEIGHT: 132 LBS | BODY MASS INDEX: 25.91 KG/M2 | DIASTOLIC BLOOD PRESSURE: 80 MMHG | RESPIRATION RATE: 16 BRPM | SYSTOLIC BLOOD PRESSURE: 130 MMHG

## 2024-10-29 DIAGNOSIS — J06.9 URI, ACUTE: ICD-10-CM

## 2024-10-29 DIAGNOSIS — J38.6 SUBGLOTTIC STENOSIS: Primary | ICD-10-CM

## 2024-10-29 PROCEDURE — 99213 OFFICE O/P EST LOW 20 MIN: CPT | Performed by: NURSE PRACTITIONER

## 2024-10-29 RX ORDER — BENZONATATE 200 MG/1
200 CAPSULE ORAL 3 TIMES DAILY PRN
Qty: 30 CAPSULE | Refills: 0 | Status: SHIPPED | OUTPATIENT
Start: 2024-10-29 | End: 2024-11-05

## 2024-10-29 RX ORDER — AZITHROMYCIN 250 MG/1
TABLET, FILM COATED ORAL
Qty: 1 PACKET | Refills: 0 | Status: SHIPPED | OUTPATIENT
Start: 2024-10-29

## 2024-10-29 ASSESSMENT — ENCOUNTER SYMPTOMS: COUGH: 1

## 2024-10-29 ASSESSMENT — PATIENT HEALTH QUESTIONNAIRE - PHQ9
SUM OF ALL RESPONSES TO PHQ QUESTIONS 1-9: 0
2. FEELING DOWN, DEPRESSED OR HOPELESS: NOT AT ALL
SUM OF ALL RESPONSES TO PHQ QUESTIONS 1-9: 0
SUM OF ALL RESPONSES TO PHQ QUESTIONS 1-9: 0
1. LITTLE INTEREST OR PLEASURE IN DOING THINGS: NOT AT ALL
SUM OF ALL RESPONSES TO PHQ9 QUESTIONS 1 & 2: 0
SUM OF ALL RESPONSES TO PHQ QUESTIONS 1-9: 0

## 2024-10-29 NOTE — PROGRESS NOTES
has been sleeping most of the time and still has a dry, hacking cough, but it is improving. He hopes to return to work tomorrow.    His mother, who also had similar symptoms, was treated in the emergency room with two doses of Ventolin and Tessalon Perles to manage mucus.      He has no known illnesses or allergies. He has been generally healthy, with only a few instances of illness. He once had a fever that required hospitalization.    His mood has been good. He was scheduled to see a doctor this week for his annual exam, but the appointment has not yet been confirmed. He is currently taking Risperdal and seems to be doing better.    Review of Systems   HENT:  Positive for congestion.    Respiratory:  Positive for cough.           Objective   Blood pressure 130/80, pulse (!) 104, temperature 98.6 °F (37 °C), temperature source Temporal, resp. rate 16, height 1.524 m (5'), weight 59.9 kg (132 lb), SpO2 97%.  Physical Exam  Tympanic membranes were clear. Throat had no erythema. Patient does have a raspy voice from his known subglottic stenosis.  Lungs had transmitted upper airway sounds.  Heart had a regular rate and rhythm.  Legs had no edema.       On this date 10/29/2024 I have spent 15 minutes reviewing previous notes, test results and face to face with the patient discussing the diagnosis and importance of compliance with the treatment plan as well as documenting on the day of the visit.    The patient (or guardian, if applicable) and other individuals in attendance with the patient were advised that Artificial Intelligence will be utilized during this visit to record, process the conversation to generate a clinical note and to support improvement of the AI technology. The patient (or guardian, if applicable) and other individuals in attendance at the appointment consented to the use of AI, including the recording.      An electronic signature was used to authenticate this note.    --LIVE Ibarra - CNP

## 2024-12-16 NOTE — TELEPHONE ENCOUNTER
[facility] requesting refill    10/29/2024  Visit date not found    Script entered

## 2024-12-20 ENCOUNTER — OFFICE VISIT (OUTPATIENT)
Dept: FAMILY MEDICINE CLINIC | Age: 24
End: 2024-12-20
Payer: COMMERCIAL

## 2024-12-20 VITALS
TEMPERATURE: 98.2 F | RESPIRATION RATE: 16 BRPM | SYSTOLIC BLOOD PRESSURE: 118 MMHG | BODY MASS INDEX: 25.39 KG/M2 | DIASTOLIC BLOOD PRESSURE: 70 MMHG | HEART RATE: 108 BPM | WEIGHT: 130 LBS

## 2024-12-20 DIAGNOSIS — G80.9 CEREBRAL PALSY, UNSPECIFIED TYPE (HCC): ICD-10-CM

## 2024-12-20 DIAGNOSIS — Z00.00 ROUTINE PHYSICAL EXAMINATION: Primary | ICD-10-CM

## 2024-12-20 DIAGNOSIS — I10 ESSENTIAL HYPERTENSION: ICD-10-CM

## 2024-12-20 PROCEDURE — 3074F SYST BP LT 130 MM HG: CPT | Performed by: NURSE PRACTITIONER

## 2024-12-20 PROCEDURE — 99395 PREV VISIT EST AGE 18-39: CPT | Performed by: NURSE PRACTITIONER

## 2024-12-20 PROCEDURE — 3078F DIAST BP <80 MM HG: CPT | Performed by: NURSE PRACTITIONER

## 2024-12-20 SDOH — ECONOMIC STABILITY: FOOD INSECURITY: WITHIN THE PAST 12 MONTHS, YOU WORRIED THAT YOUR FOOD WOULD RUN OUT BEFORE YOU GOT MONEY TO BUY MORE.: NEVER TRUE

## 2024-12-20 SDOH — ECONOMIC STABILITY: FOOD INSECURITY: WITHIN THE PAST 12 MONTHS, THE FOOD YOU BOUGHT JUST DIDN'T LAST AND YOU DIDN'T HAVE MONEY TO GET MORE.: NEVER TRUE

## 2024-12-20 SDOH — ECONOMIC STABILITY: INCOME INSECURITY: HOW HARD IS IT FOR YOU TO PAY FOR THE VERY BASICS LIKE FOOD, HOUSING, MEDICAL CARE, AND HEATING?: NOT HARD AT ALL

## 2024-12-20 ASSESSMENT — ENCOUNTER SYMPTOMS
RESPIRATORY NEGATIVE: 1
VOICE CHANGE: 1
GASTROINTESTINAL NEGATIVE: 1
EYES NEGATIVE: 1
TROUBLE SWALLOWING: 1

## 2024-12-20 NOTE — PROGRESS NOTES
1    fluticasone-salmeterol (ADVAIR HFA) 115-21 MCG/ACT inhaler Inhale 2 puffs into the lungs 2 times daily Rinse mouth after its use. 3 each 0    tiotropium (SPIRIVA RESPIMAT) 2.5 MCG/ACT AERS inhaler Inhale 2 puffs into the lungs daily 12 g 1    amLODIPine (NORVASC) 5 MG tablet TAKE 1 TABLET DAILY AS NEEDED FOR ELEVATED BLOOD PRESSURE > 140 30 tablet 5    levalbuterol (XOPENEX HFA) 45 MCG/ACT inhaler Inhale 2 puffs into the lungs every 4-6 hours as needed for Wheezing 15 g 5    Spacer/Aero-Holding Chambers CLIFFORD 1 Device by Does not apply route daily as needed (Levalbuterol) 1 each 0    risperiDONE (RISPERDAL) 2 MG tablet Take by mouth nightly       onabotulinumtoxin A (BOTOX) 100 units injection Inject 100 Units into the muscle Every 4 months (last dose July 2021)  Used for leg spasms      Famotidine (PEPCID PO) Take 10 mg by mouth daily       Multiple Vitamin (MULTI-VITAMIN DAILY PO) Take 1 tablet by mouth daily       NONFORMULARY Take 1 tablet by mouth 2 times daily Cerevive       baclofen (LIORESAL) 20 MG tablet Take 1 tablet by mouth 3 times daily      ACETAMINOPHEN PO Take 325 mg by mouth every 4 hours as needed       azithromycin (ZITHROMAX) 250 MG tablet Take 2 tabs (500 mg) on Day 1, and take 1 tab (250 mg) on days 2 through 5. (Patient not taking: Reported on 12/20/2024) 1 packet 0     No current facility-administered medications for this visit.     Allergies   Allergen Reactions    Cephalosporins Dermatitis     Lethargic, red ears     Health Maintenance   Topic Date Due    HPV vaccine (2 - Male 3-dose series) 09/14/2017    DTaP/Tdap/Td vaccine (7 - Td or Tdap) 10/25/2022    Flu vaccine (1) 08/01/2024    COVID-19 Vaccine (4 - 2023-24 season) 09/01/2024    Depression Screen  10/29/2025    Hepatitis A vaccine  Completed    Hepatitis B vaccine  Completed    Hib vaccine  Completed    Polio vaccine  Completed    Varicella vaccine  Completed    Meningococcal (ACWY) vaccine  Completed    Pneumococcal 0-64 years

## 2025-01-20 NOTE — TELEPHONE ENCOUNTER
Last visit- 12/20/2024  Next visit- Visit date not found    Requested Prescriptions     Pending Prescriptions Disp Refills    linaclotide (LINZESS) 145 MCG capsule 90 capsule 1     Sig: Take 1 capsule by mouth every morning (before breakfast)

## 2025-01-21 RX ORDER — FLUTICASONE PROPIONATE AND SALMETEROL XINAFOATE 115; 21 UG/1; UG/1
2 AEROSOL, METERED RESPIRATORY (INHALATION) 2 TIMES DAILY
Qty: 3 EACH | Refills: 0 | Status: SHIPPED | OUTPATIENT
Start: 2025-01-21 | End: 2025-04-21

## 2025-01-21 NOTE — TELEPHONE ENCOUNTER
Received refill request for fluticasone-salmeterol (ADVAIR HFA) 115-21 MCG/ACT inhaler.   Medication was last ordered by Dr Zarco.   Medication was last ordered on 10/21/24 with 0 refills.     Patient was last seen in the office 7/24/24.   Does patient have a scheduled follow up?: yes - 1/31/25.    Medication needs to be sent to   [facility] [address] [place] [address] - P [phone] - F [phone]  [address] [place] [address]  Phone: [phone]  Fax: [phone]        Thank you, please advise!    Patient's Allergies:  Allergies   Allergen Reactions    Cephalosporins Dermatitis     Lethargic, red ears

## 2025-01-21 NOTE — TELEPHONE ENCOUNTER
Spoke with patient's mother (on HIPAA) to notify refill sent to [facility] in [place] per Dr Zarco.  Patient's mother expressed understanding and had no questions at this time.

## 2025-01-24 ENCOUNTER — HOSPITAL ENCOUNTER (OUTPATIENT)
Dept: PULMONOLOGY | Age: 25
Discharge: HOME | End: 2025-01-24
Attending: INTERNAL MEDICINE
Payer: COMMERCIAL

## 2025-01-24 DIAGNOSIS — J44.9 STAGE 3 SEVERE COPD BY GOLD CLASSIFICATION (HCC): ICD-10-CM

## 2025-01-24 PROCEDURE — 94060 EVALUATION OF WHEEZING: CPT

## 2025-01-29 NOTE — PROGRESS NOTES
Neck Circumference -   16.5  Mallampati - 2    Lung Nodule Screening     [] Qualifies    [x] Does not qualify   [] Declined    [] Completed

## 2025-01-30 ENCOUNTER — OFFICE VISIT (OUTPATIENT)
Dept: PULMONOLOGY | Age: 25
End: 2025-01-30
Payer: COMMERCIAL

## 2025-01-30 VITALS
OXYGEN SATURATION: 98 % | WEIGHT: 131.8 LBS | HEIGHT: 60 IN | DIASTOLIC BLOOD PRESSURE: 68 MMHG | SYSTOLIC BLOOD PRESSURE: 128 MMHG | TEMPERATURE: 99 F | HEART RATE: 85 BPM | BODY MASS INDEX: 25.87 KG/M2

## 2025-01-30 DIAGNOSIS — J44.9 CHRONIC OBSTRUCTIVE PULMONARY DISEASE, UNSPECIFIED COPD TYPE (HCC): Primary | ICD-10-CM

## 2025-01-30 DIAGNOSIS — G80.1 CP (CEREBRAL PALSY), SPASTIC (HCC): ICD-10-CM

## 2025-01-30 DIAGNOSIS — Z98.890 HISTORY OF TRACHEOSTOMY AS A CHILD: ICD-10-CM

## 2025-01-30 DIAGNOSIS — J98.4 CLD (CHRONIC LUNG DISEASE): ICD-10-CM

## 2025-01-30 DIAGNOSIS — J38.6 SUBGLOTTIC STENOSIS: ICD-10-CM

## 2025-01-30 DIAGNOSIS — Z87.09 HISTORY OF TRACHEOSTOMY AS A CHILD: ICD-10-CM

## 2025-01-30 PROCEDURE — 99213 OFFICE O/P EST LOW 20 MIN: CPT | Performed by: INTERNAL MEDICINE

## 2025-01-30 NOTE — PROGRESS NOTES
Subjective:      Patient ID: Og Mike is a 24 y.o. male.      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,   Mild cognitive delay continues vocational school with [employer] in [place]    His respiratory regimen includes Advair  mg via spacer, Spiriva 2.5 mg 2 inhalations daily, Xopenex nebulizations as needed    No adverse reactions to treatment, requesting refill for Spiriva      Previous notes  No admissions to the hospital, no visits to the emergency department  Mom is a retired respiratory therapist and very familiar with Og's condition  Today we have follow-up with spirometry to review, continues revealing severe fixed airway obstruction consistent with COPD with no visible bronchodilatory response, no significant changes from 5/3/2022.    No adverse effects with respiratory regimen to include Advair  2 inhalations daily, Xopenex in as-needed basis, Spiriva 2.5 mg 2 inhalations daily.      Continues attending vocational school, exercising, started on Paxil for some behavioral issues  Mom reports that Og gets short of breath with moderate activities.  Last set of pulmonary function test revealed a severe airway obstruction impairment with a postbronchodilator FEV1 of 42%   with excellent response to bronchodilators without 29% improvement this is likely combination of asthma and COPD  His current regimen includes Advair /21 2 inhalations twice a day prescription sent to pharmacy, levalbuterol HFA as needed, I believe Og would

## 2025-03-19 ENCOUNTER — TELEPHONE (OUTPATIENT)
Dept: PULMONOLOGY | Age: 25
End: 2025-03-19

## 2025-03-31 RX ORDER — TIOTROPIUM BROMIDE INHALATION SPRAY 3.12 UG/1
SPRAY, METERED RESPIRATORY (INHALATION)
Qty: 4 G | Refills: 5 | Status: SHIPPED | OUTPATIENT
Start: 2025-03-31

## 2025-04-03 ENCOUNTER — HOSPITAL ENCOUNTER (OUTPATIENT)
Age: 25
Discharge: HOME | End: 2025-04-03
Payer: COMMERCIAL

## 2025-04-03 DIAGNOSIS — I10 ESSENTIAL HYPERTENSION: ICD-10-CM

## 2025-04-03 DIAGNOSIS — N20.0 NEPHROLITHIASIS: ICD-10-CM

## 2025-04-03 LAB
ALBUMIN SERPL BCG-MCNC: 4.2 G/DL (ref 3.4–4.9)
AMORPH SED URNS QL MICRO: NORMAL
ANION GAP SERPL CALC-SCNC: 12 MEQ/L (ref 8–16)
BACTERIA URNS QL MICRO: NORMAL
BILIRUB UR QL STRIP.AUTO: NEGATIVE
BUN SERPL-MCNC: 21 MG/DL (ref 8–23)
CALCIUM SERPL-MCNC: 9.3 MG/DL (ref 8.6–10)
CASTS #/AREA URNS LPF: NORMAL /LPF
CHARACTER UR: CLEAR
CHLORIDE SERPL-SCNC: 104 MEQ/L (ref 98–111)
CO2 SERPL-SCNC: 24 MEQ/L (ref 22–29)
COLOR UR AUTO: YELLOW
CREAT SERPL-MCNC: 0.7 MG/DL (ref 0.7–1.2)
CREAT UR-MCNC: 61.7 MG/DL
CREAT UR-MCNC: 61.7 MG/DL
CRYSTALS VITF MICRO: NORMAL
EPI CELLS #/AREA URNS HPF: NORMAL /HPF
GFR SERPL CREATININE-BSD FRML MDRD: > 90 ML/MIN/1.73M2
GLUCOSE SERPL-MCNC: 106 MG/DL (ref 74–109)
GLUCOSE UR QL STRIP.AUTO: NEGATIVE MG/DL
HGB UR STRIP.AUTO-MCNC: NEGATIVE MG/L
KETONES UR QL STRIP.AUTO: NEGATIVE
LEUKOCYTE ESTERASE UR QL STRIP.AUTO: NEGATIVE
MICROALBUMIN UR-MCNC: < 2 MG/DL
MICROALBUMIN/CREAT RATIO PNL UR: 32 MG/G (ref 0–30)
MUCOUS THREADS URNS QL MICRO: NORMAL
NITRITE UR QL STRIP.AUTO: NEGATIVE
PH UR STRIP.AUTO: 6.5 [PH] (ref 5–9)
PHOSPHATE SERPL-MCNC: 3.6 MG/DL (ref 2.5–4.5)
POTASSIUM SERPL-SCNC: 3.9 MEQ/L (ref 3.5–5.2)
PROT UR STRIP.AUTO-MCNC: NEGATIVE MG/DL
PROT UR-MCNC: 7.7 MG/DL
PROT/CREAT 24H UR: 0.12 MG/G{CREAT}
RBC #/AREA URNS HPF: NORMAL /HPF
SODIUM SERPL-SCNC: 140 MEQ/L (ref 135–145)
SP GR UR STRIP.AUTO: 1.01 (ref 1–1.03)
UROBILINOGEN UR STRIP-ACNC: 0.2 EU/DL (ref 0.2–1)
WBC # UR STRIP.AUTO: NORMAL /HPF

## 2025-04-03 PROCEDURE — 36415 COLL VENOUS BLD VENIPUNCTURE: CPT

## 2025-04-03 PROCEDURE — 82043 UR ALBUMIN QUANTITATIVE: CPT

## 2025-04-03 PROCEDURE — 80069 RENAL FUNCTION PANEL: CPT

## 2025-04-03 PROCEDURE — 81001 URINALYSIS AUTO W/SCOPE: CPT

## 2025-04-03 PROCEDURE — 82570 ASSAY OF URINE CREATININE: CPT

## 2025-04-03 PROCEDURE — 84156 ASSAY OF PROTEIN URINE: CPT

## 2025-04-10 ENCOUNTER — OFFICE VISIT (OUTPATIENT)
Dept: NEPHROLOGY | Age: 25
End: 2025-04-10
Payer: COMMERCIAL

## 2025-04-10 VITALS
HEART RATE: 101 BPM | DIASTOLIC BLOOD PRESSURE: 90 MMHG | OXYGEN SATURATION: 98 % | BODY MASS INDEX: 25.19 KG/M2 | WEIGHT: 129 LBS | SYSTOLIC BLOOD PRESSURE: 142 MMHG

## 2025-04-10 DIAGNOSIS — N20.0 NEPHROLITHIASIS: Primary | ICD-10-CM

## 2025-04-10 DIAGNOSIS — I10 ESSENTIAL HYPERTENSION: ICD-10-CM

## 2025-04-10 PROCEDURE — 99213 OFFICE O/P EST LOW 20 MIN: CPT | Performed by: INTERNAL MEDICINE

## 2025-04-10 PROCEDURE — G2211 COMPLEX E/M VISIT ADD ON: HCPCS | Performed by: INTERNAL MEDICINE

## 2025-04-10 PROCEDURE — 3080F DIAST BP >= 90 MM HG: CPT | Performed by: INTERNAL MEDICINE

## 2025-04-10 PROCEDURE — 3077F SYST BP >= 140 MM HG: CPT | Performed by: INTERNAL MEDICINE
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
## 2025-06-07 ENCOUNTER — PATIENT MESSAGE (OUTPATIENT)
Dept: FAMILY MEDICINE CLINIC | Age: 25
End: 2025-06-07

## 2025-06-09 RX ORDER — FLUTICASONE PROPIONATE AND SALMETEROL XINAFOATE 115; 21 UG/1; UG/1
2 AEROSOL, METERED RESPIRATORY (INHALATION) 2 TIMES DAILY
Qty: 3 EACH | Refills: 0 | Status: SHIPPED | OUTPATIENT
Start: 2025-06-09 | End: 2025-06-09 | Stop reason: SDUPTHER

## 2025-06-09 RX ORDER — FLUTICASONE PROPIONATE AND SALMETEROL XINAFOATE 115; 21 UG/1; UG/1
2 AEROSOL, METERED RESPIRATORY (INHALATION) 2 TIMES DAILY
Qty: 36 G | Refills: 1 | Status: SHIPPED | OUTPATIENT
Start: 2025-06-09 | End: 2025-10-22

## 2025-06-09 NOTE — TELEPHONE ENCOUNTER
Received refill request for Advair.   Medication was last ordered by PCP.   Medication was last ordered on 06/09/25 with 0 refills (wrong pharmacy).    Patient was last seen in the office by luz elena on 01/30/25.   Does patient have a scheduled follow up?: yes - with andie 07/25/25    Medication needs to be sent to [facility] [place].     Sending this request to Andie as he refilled spiriva for patient as well.    Thank you, please advise!    Patient's Allergies:  Allergies   Allergen Reactions    Cephalosporins Dermatitis     Lethargic, red ears

## 2025-07-14 RX ORDER — LINACLOTIDE 145 UG/1
CAPSULE, GELATIN COATED ORAL
Qty: 90 CAPSULE | Refills: 0 | Status: SHIPPED | OUTPATIENT
Start: 2025-07-14